# Patient Record
Sex: MALE | Race: WHITE | NOT HISPANIC OR LATINO
[De-identification: names, ages, dates, MRNs, and addresses within clinical notes are randomized per-mention and may not be internally consistent; named-entity substitution may affect disease eponyms.]

---

## 2017-12-13 ENCOUNTER — RX RENEWAL (OUTPATIENT)
Age: 77
End: 2017-12-13

## 2018-05-03 ENCOUNTER — APPOINTMENT (OUTPATIENT)
Dept: NEUROLOGY | Facility: CLINIC | Age: 78
End: 2018-05-03
Payer: MEDICARE

## 2018-05-03 VITALS
BODY MASS INDEX: 23.1 KG/M2 | SYSTOLIC BLOOD PRESSURE: 110 MMHG | HEIGHT: 71 IN | DIASTOLIC BLOOD PRESSURE: 82 MMHG | WEIGHT: 165 LBS

## 2018-05-03 DIAGNOSIS — Z86.79 PERSONAL HISTORY OF OTHER DISEASES OF THE CIRCULATORY SYSTEM: ICD-10-CM

## 2018-05-03 DIAGNOSIS — Z87.39 PERSONAL HISTORY OF OTHER DISEASES OF THE MUSCULOSKELETAL SYSTEM AND CONNECTIVE TISSUE: ICD-10-CM

## 2018-05-03 DIAGNOSIS — Z86.39 PERSONAL HISTORY OF OTHER ENDOCRINE, NUTRITIONAL AND METABOLIC DISEASE: ICD-10-CM

## 2018-05-03 PROCEDURE — 99214 OFFICE O/P EST MOD 30 MIN: CPT

## 2018-05-03 RX ORDER — ETANERCEPT 50 MG/ML
50 SOLUTION SUBCUTANEOUS
Qty: 12 | Refills: 0 | Status: ACTIVE | COMMUNITY
Start: 2018-03-22

## 2018-05-03 RX ORDER — CIPROFLOXACIN HYDROCHLORIDE 500 MG/1
500 TABLET, FILM COATED ORAL
Qty: 20 | Refills: 0 | Status: COMPLETED | COMMUNITY
Start: 2018-01-16

## 2018-05-03 RX ORDER — ATORVASTATIN CALCIUM 80 MG/1
80 TABLET, FILM COATED ORAL
Qty: 90 | Refills: 0 | Status: COMPLETED | COMMUNITY
Start: 2018-03-11

## 2018-05-03 RX ORDER — CLOPIDOGREL 75 MG/1
75 TABLET, FILM COATED ORAL
Refills: 0 | Status: ACTIVE | COMMUNITY

## 2018-05-03 RX ORDER — CLOPIDOGREL BISULFATE 75 MG/1
75 TABLET, FILM COATED ORAL
Qty: 90 | Refills: 0 | Status: COMPLETED | COMMUNITY
Start: 2018-01-08

## 2018-05-03 RX ORDER — METOPROLOL SUCCINATE 50 MG/1
50 TABLET, EXTENDED RELEASE ORAL
Qty: 90 | Refills: 0 | Status: ACTIVE | COMMUNITY
Start: 2018-01-08

## 2018-05-03 RX ORDER — OXCARBAZEPINE 150 MG/1
150 TABLET, FILM COATED ORAL
Refills: 0 | Status: COMPLETED | COMMUNITY

## 2018-05-03 RX ORDER — ATORVASTATIN CALCIUM 10 MG/1
10 TABLET, FILM COATED ORAL
Refills: 0 | Status: ACTIVE | COMMUNITY

## 2018-05-03 RX ORDER — ENALAPRIL MALEATE 5 MG/1
TABLET ORAL
Refills: 0 | Status: COMPLETED | COMMUNITY

## 2018-11-01 ENCOUNTER — APPOINTMENT (OUTPATIENT)
Dept: NEUROLOGY | Facility: CLINIC | Age: 78
End: 2018-11-01

## 2018-12-12 ENCOUNTER — APPOINTMENT (OUTPATIENT)
Dept: NEUROLOGY | Facility: CLINIC | Age: 78
End: 2018-12-12
Payer: MEDICARE

## 2018-12-12 VITALS
DIASTOLIC BLOOD PRESSURE: 78 MMHG | WEIGHT: 168 LBS | BODY MASS INDEX: 23.52 KG/M2 | SYSTOLIC BLOOD PRESSURE: 140 MMHG | HEIGHT: 71 IN

## 2018-12-12 PROCEDURE — 99214 OFFICE O/P EST MOD 30 MIN: CPT

## 2018-12-12 NOTE — HISTORY OF PRESENT ILLNESS
[FreeTextEntry1] : I saw this patient in the office today.\par \par As you recall he is a long history of tremors.\par This has been going on for years.\par It is mild to moderate but does not bother him significantly.\par He had no response to primidone. He is already on a beta blocker.\par \par He also has a history of trigeminal neuralgia. He has had several exacerbations over the years. He is currently on Trileptal at a dosage of 300 mg twice per day.\par \par He underwent trigeminal neuralgia surgery in 2012.\par \par \par

## 2018-12-12 NOTE — PHYSICAL EXAM
[General Appearance - Alert] : alert [General Appearance - In No Acute Distress] : in no acute distress [Oriented To Time, Place, And Person] : oriented to person, place, and time [Memory Recent] : recent memory was not impaired [Memory Remote] : remote memory was not impaired [Cranial Nerves Optic (II)] : visual acuity intact bilaterally,  visual fields full to confrontation, pupils equal round and reactive to light [Cranial Nerves Oculomotor (III)] : extraocular motion intact [Cranial Nerves Trigeminal (V)] : facial sensation intact symmetrically [Cranial Nerves Facial (VII)] : face symmetrical [Cranial Nerves Vestibulocochlear (VIII)] : hearing was intact bilaterally [Cranial Nerves Glossopharyngeal (IX)] : tongue and palate midline [Cranial Nerves Accessory (XI - Cranial And Spinal)] : head turning and shoulder shrug symmetric [Cranial Nerves Hypoglossal (XII)] : there was no tongue deviation with protrusion [Motor Tone] : muscle tone was normal in all four extremities [Motor Strength] : muscle strength was normal in all four extremities [Sensation Tactile Decrease] : light touch was intact [Sensation Pain / Temperature Decrease] : pain and temperature was intact [Sensation Vibration Decrease] : vibration was intact [Tremor] : a tremor present [2+] : Patella left 2+ [Optic Disc Abnormality] : the optic disc were normal in size and color [Edema] : there was no peripheral edema [Abnormal Walk] : normal gait [Dysarthria] : no dysarthria [Aphasia] : no dysphasia/aphasia [Romberg's Sign] : Romberg's sign was negtive [Coordination - Dysmetria Impaired Finger-to-Nose Bilateral] : not present [Plantar Reflex Right Only] : normal on the right [Plantar Reflex Left Only] : normal on the left

## 2018-12-12 NOTE — ASSESSMENT
[FreeTextEntry1] : This is a 78 year old man with a history of essential tremor. He made no response to Mysoline. He is currently on a beta blocker. He will continue this.\par \par He also has trigeminal neuralgia for which she is status post surgery. He had one brief exacerbation about 6 weeks ago.\par I will continue Trileptal 300 mg twice per day.\par \par I will see him back in 6 months.

## 2018-12-12 NOTE — CONSULT LETTER
[Dear  ___] : Dear  [unfilled], [Courtesy Letter:] : I had the pleasure of seeing your patient, [unfilled], in my office today. [Please see my note below.] : Please see my note below. [Consult Closing:] : Thank you very much for allowing me to participate in the care of this patient.  If you have any questions, please do not hesitate to contact me. [Sincerely,] : Sincerely, [FreeTextEntry3] : Maikol Mcghee MD.

## 2018-12-12 NOTE — REVIEW OF SYSTEMS
[As Noted in HPI] : as noted in HPI [Eye Pain] : eye pain [Chest Pain] : chest pain [Joint Pain] : joint pain [Easy Bleeding] : a tendency for easy bleeding [Negative] : Heme/Lymph

## 2018-12-13 ENCOUNTER — RX RENEWAL (OUTPATIENT)
Age: 78
End: 2018-12-13

## 2019-01-15 ENCOUNTER — EMERGENCY (EMERGENCY)
Facility: HOSPITAL | Age: 79
LOS: 1 days | Discharge: HOME | End: 2019-01-15
Attending: EMERGENCY MEDICINE
Payer: MEDICARE

## 2019-01-15 VITALS
HEIGHT: 71 IN | WEIGHT: 190.04 LBS | OXYGEN SATURATION: 98 % | SYSTOLIC BLOOD PRESSURE: 172 MMHG | TEMPERATURE: 98 F | DIASTOLIC BLOOD PRESSURE: 90 MMHG | RESPIRATION RATE: 16 BRPM | HEART RATE: 64 BPM

## 2019-01-15 PROCEDURE — 99285 EMERGENCY DEPT VISIT HI MDM: CPT | Mod: 25

## 2019-01-15 PROCEDURE — 93010 ELECTROCARDIOGRAM REPORT: CPT

## 2019-01-15 NOTE — ED ADULT TRIAGE NOTE - CHIEF COMPLAINT QUOTE
Pt states "I was coming back from [place] and on the plane I started getting really sweaty and had nausea with some difficulty breathing", denies diaphoresis and nausea at this time, denies chest pain

## 2019-01-16 VITALS
RESPIRATION RATE: 18 BRPM | SYSTOLIC BLOOD PRESSURE: 153 MMHG | DIASTOLIC BLOOD PRESSURE: 87 MMHG | OXYGEN SATURATION: 99 % | HEART RATE: 66 BPM

## 2019-01-16 LAB
ALBUMIN SERPL ELPH-MCNC: 4.1 G/DL — SIGNIFICANT CHANGE UP (ref 3.3–5.2)
ALP SERPL-CCNC: 83 U/L — SIGNIFICANT CHANGE UP (ref 40–120)
ALT FLD-CCNC: 21 U/L — SIGNIFICANT CHANGE UP
ANION GAP SERPL CALC-SCNC: 12 MMOL/L — SIGNIFICANT CHANGE UP (ref 5–17)
AST SERPL-CCNC: 23 U/L — SIGNIFICANT CHANGE UP
BILIRUB SERPL-MCNC: 0.3 MG/DL — LOW (ref 0.4–2)
BUN SERPL-MCNC: 17 MG/DL — SIGNIFICANT CHANGE UP (ref 8–20)
CALCIUM SERPL-MCNC: 9 MG/DL — SIGNIFICANT CHANGE UP (ref 8.6–10.2)
CHLORIDE SERPL-SCNC: 99 MMOL/L — SIGNIFICANT CHANGE UP (ref 98–107)
CO2 SERPL-SCNC: 26 MMOL/L — SIGNIFICANT CHANGE UP (ref 22–29)
CREAT SERPL-MCNC: 0.96 MG/DL — SIGNIFICANT CHANGE UP (ref 0.5–1.3)
GLUCOSE SERPL-MCNC: 118 MG/DL — HIGH (ref 70–115)
HCT VFR BLD CALC: 45.5 % — SIGNIFICANT CHANGE UP (ref 42–52)
HGB BLD-MCNC: 14.7 G/DL — SIGNIFICANT CHANGE UP (ref 14–18)
LIDOCAIN IGE QN: 31 U/L — SIGNIFICANT CHANGE UP (ref 22–51)
MAGNESIUM SERPL-MCNC: 1.8 MG/DL — SIGNIFICANT CHANGE UP (ref 1.6–2.6)
MCHC RBC-ENTMCNC: 30.6 PG — SIGNIFICANT CHANGE UP (ref 27–31)
MCHC RBC-ENTMCNC: 32.3 G/DL — SIGNIFICANT CHANGE UP (ref 32–36)
MCV RBC AUTO: 94.8 FL — HIGH (ref 80–94)
NT-PROBNP SERPL-SCNC: 135 PG/ML — SIGNIFICANT CHANGE UP (ref 0–300)
PLATELET # BLD AUTO: 230 K/UL — SIGNIFICANT CHANGE UP (ref 150–400)
POTASSIUM SERPL-MCNC: 4.2 MMOL/L — SIGNIFICANT CHANGE UP (ref 3.5–5.3)
POTASSIUM SERPL-SCNC: 4.2 MMOL/L — SIGNIFICANT CHANGE UP (ref 3.5–5.3)
PROT SERPL-MCNC: 7 G/DL — SIGNIFICANT CHANGE UP (ref 6.6–8.7)
RBC # BLD: 4.8 M/UL — SIGNIFICANT CHANGE UP (ref 4.6–6.2)
RBC # FLD: 13.3 % — SIGNIFICANT CHANGE UP (ref 11–15.6)
SODIUM SERPL-SCNC: 137 MMOL/L — SIGNIFICANT CHANGE UP (ref 135–145)
TROPONIN T SERPL-MCNC: <0.01 NG/ML — SIGNIFICANT CHANGE UP (ref 0–0.06)
TROPONIN T SERPL-MCNC: <0.01 NG/ML — SIGNIFICANT CHANGE UP (ref 0–0.06)
WBC # BLD: 10.1 K/UL — SIGNIFICANT CHANGE UP (ref 4.8–10.8)
WBC # FLD AUTO: 10.1 K/UL — SIGNIFICANT CHANGE UP (ref 4.8–10.8)

## 2019-01-16 PROCEDURE — 83690 ASSAY OF LIPASE: CPT

## 2019-01-16 PROCEDURE — 36415 COLL VENOUS BLD VENIPUNCTURE: CPT

## 2019-01-16 PROCEDURE — 83880 ASSAY OF NATRIURETIC PEPTIDE: CPT

## 2019-01-16 PROCEDURE — 71046 X-RAY EXAM CHEST 2 VIEWS: CPT

## 2019-01-16 PROCEDURE — 99284 EMERGENCY DEPT VISIT MOD MDM: CPT | Mod: 25

## 2019-01-16 PROCEDURE — 84484 ASSAY OF TROPONIN QUANT: CPT

## 2019-01-16 PROCEDURE — 85027 COMPLETE CBC AUTOMATED: CPT

## 2019-01-16 PROCEDURE — 80053 COMPREHEN METABOLIC PANEL: CPT

## 2019-01-16 PROCEDURE — 93010 ELECTROCARDIOGRAM REPORT: CPT

## 2019-01-16 PROCEDURE — 71046 X-RAY EXAM CHEST 2 VIEWS: CPT | Mod: 26

## 2019-01-16 PROCEDURE — 93005 ELECTROCARDIOGRAM TRACING: CPT

## 2019-01-16 PROCEDURE — 83735 ASSAY OF MAGNESIUM: CPT

## 2019-01-16 RX ORDER — METOPROLOL TARTRATE 50 MG
0 TABLET ORAL
Qty: 0 | Refills: 0 | COMMUNITY

## 2019-01-16 RX ORDER — GABAPENTIN 400 MG/1
1 CAPSULE ORAL
Qty: 0 | Refills: 0 | COMMUNITY

## 2019-01-16 RX ORDER — ETANERCEPT 25 MG
0 VIAL (EA) SUBCUTANEOUS
Qty: 0 | Refills: 0 | COMMUNITY

## 2019-01-16 RX ORDER — CLOPIDOGREL BISULFATE 75 MG/1
1 TABLET, FILM COATED ORAL
Qty: 0 | Refills: 0 | COMMUNITY

## 2019-01-16 RX ORDER — ATORVASTATIN CALCIUM 80 MG/1
1 TABLET, FILM COATED ORAL
Qty: 0 | Refills: 0 | COMMUNITY
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
## 2019-01-16 NOTE — ED ADULT NURSE REASSESSMENT NOTE - NS ED NURSE REASSESS COMMENT FT1
wife and Son left to go home.  requesting that kt that is a  in [facility] be called  Dr. Sarah Nails [phone]. Dr hooks aware

## 2019-01-16 NOTE — ED ADULT NURSE NOTE - NSIMPLEMENTINTERV_GEN_ALL_ED
Implemented All Fall with Harm Risk Interventions:  [place] to call system. Call bell, personal items and telephone within reach. Instruct patient to call for assistance. Room bathroom lighting operational. Non-slip footwear when patient is off stretcher. Physically safe environment: no spills, clutter or unnecessary equipment. Stretcher in lowest position, wheels locked, appropriate side rails in place. Provide visual cue, wrist band, yellow gown, etc. Monitor gait and stability. Monitor for mental status changes and reorient to person, place, and time. Review medications for side effects contributing to fall risk. Reinforce activity limits and safety measures with patient and family. Provide visual clues: red socks.
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
## 2019-01-16 NOTE — ED PROVIDER NOTE - OBJECTIVE STATEMENT
77 y/o M with PMHx of CAD, HTN, dyslipidemia and MI (in 2002) presents to ED c/o sudden onset of lightheadedness, shortness of breath, nausea and diaphoresis while on a plane home from [facility]. He states his symptoms began suddenly but wearing oxygen provided relief. Secondary to current symptoms, notes he has had a few episodes of diarrhea, which he attributes to the new and spicy foods he ate while on vacation. Denies recent fevers, chest pain, abdominal pain, vomiting, leg pain or headache. States he is currently asymptomatic, but was concerned because his previous MI caused extreme diaphoresis without chest pain. No further acute complaints at this time.     Cardiologist: Dr. Bk Coyle ([facility])- last cardiac workup including stress test was within the past year, was told everything was WNL 79 y/o M with PMHx of CAD, HTN, dyslipidemia and MI (in 2002) presents to ED c/o sudden onset of shortness of breath, nausea , diaphoresis and mild lightheadedness while on a plane home from [facility]. He states his symptoms began suddenly but wearing oxygen provided relief. Secondary to current symptoms, notes he has had a few episodes of diarrhea, which he attributes to the new and spicy foods he ate while on vacation. Denies recent fevers, chest pain, abdominal pain, vomiting, leg pain or headache. States he is currently asymptomatic, but was concerned because his previous MI caused extreme diaphoresis without chest pain. No further acute complaints at this time.     Cardiologist: Dr. Bk Coyle ([facility])- last cardiac workup including stress test was within the past year, was told everything was WNL 79 y/o M with PMHx of CAD, HTN, dyslipidemia and MI (in 2002) presents to ED c/o nausea , diaphoresis and mild lightheadedness while on a plane home from [facility]. He states his symptoms began suddenly but wearing oxygen provided relief. Secondary to current symptoms, notes he has had a few episodes of diarrhea, which he attributes to the new and spicy foods he ate while on vacation. Denies recent fevers, chest pain, abdominal pain, vomiting, leg pain or headache. States he is currently asymptomatic, but was concerned because his previous MI caused extreme diaphoresis without chest pain. No further acute complaints at this time.     Cardiologist: Dr. Bk Coyle ([facility])- last cardiac workup including stress test was within the past year, was told everything was WNL 77 y/o M with PMHx of CAD, HTN, dyslipidemia and MI (in 2002) presents to ED c/o nausea, diaphoresis and mild lightheadedness while on a plane home from [facility]. He states his symptoms began suddenly but wearing oxygen provided relief. Secondary to current symptoms, notes he has had a few episodes of diarrhea, which he attributes to the new and spicy foods he ate while on vacation. Denies recent fevers, chest pain, abdominal pain, vomiting, leg pain or headache. States he is currently asymptomatic, but was concerned because his previous MI caused extreme diaphoresis without chest pain in 2002. Patient had a normal stress test in november 2018. No further acute complaints at this time.   Cardiologist: Dr. Bk Coyle ([facility])- last cardiac workup including stress test was within the past year, was told everything was WNL

## 2019-01-16 NOTE — ED PROVIDER NOTE - PMH
Bell's palsy  left side 1989  CAD (coronary artery disease)  MI 2002  Dyslipidemia    H/O benign essential tremor    HTN (hypertension)    Rheumatoid arthritis

## 2019-01-16 NOTE — ED ADULT NURSE REASSESSMENT NOTE - NS ED NURSE REASSESS COMMENT FT1
pt received awake and alert resting comfortably denies any chest pain no sob present. pt sinus on monitor. Dr Romero from cardiology at the bedside

## 2019-01-16 NOTE — ED PROVIDER NOTE - PROGRESS NOTE DETAILS
patient has remained stable. [place]ology paged for consultation. patient has remained stable without return of symptoms. [place] [facility]ology paged for consultation. patient has remained stable without return of symptoms. [facility] paged for consultation. Case discussed with patient daughter at Mr. Nails's request. SEEN BY DIANNA PORRAS CARDIO (MD ERROR)  CLEARED FOR DC AND OUTPT FOLLOW UP  CARDIO SPOKE WITH DAUGHTER RE FINDINGS

## 2019-01-16 NOTE — CONSULT NOTE ADULT - ASSESSMENT
1. Elderly male presented from airport with diaphoresis nausea and some dyspnea. Currently w/o sx's.  2. hx of cad/remote mi with stents to rca and circ-ekg nl and troponins are neg. He had a negative nuclear stress test recently. No current evidence to suggest ACS. Can dc on current regimen to follow up with his cardiologist.  3. hx ofbell's palsy  4. hx of RA.

## 2019-01-16 NOTE — ED ADULT NURSE NOTE - OBJECTIVE STATEMENT
patient states was traveling from [facility] and started having lightheaded sweaty and nausea while on plane . patient deneis any chest pain or short of breath.

## 2019-01-16 NOTE — ED PROVIDER NOTE - MEDICAL DECISION MAKING DETAILS
Patient with episode of nausea and diaphoresis, possible GI in nature, but also may be cardiac, will obtain labs and observe.

## 2019-01-17 NOTE — ED POST DISCHARGE NOTE - RESULT SUMMARY
called and spoke with pt -pt denies any fever  or chills or coughing - cxr explained to the pt and recommended  f/u with primary care for further evaluation

## 2019-03-12 ENCOUNTER — MEDICATION RENEWAL (OUTPATIENT)
Age: 79
End: 2019-03-12

## 2019-06-12 ENCOUNTER — APPOINTMENT (OUTPATIENT)
Dept: NEUROLOGY | Facility: CLINIC | Age: 79
End: 2019-06-12
Payer: MEDICARE

## 2019-06-12 VITALS
WEIGHT: 165 LBS | BODY MASS INDEX: 23.1 KG/M2 | HEIGHT: 71 IN | DIASTOLIC BLOOD PRESSURE: 60 MMHG | SYSTOLIC BLOOD PRESSURE: 110 MMHG

## 2019-06-12 PROCEDURE — 99213 OFFICE O/P EST LOW 20 MIN: CPT

## 2019-06-12 NOTE — PHYSICAL EXAM
[General Appearance - Alert] : alert [Oriented To Time, Place, And Person] : oriented to person, place, and time [General Appearance - In No Acute Distress] : in no acute distress [Memory Recent] : recent memory was not impaired [Memory Remote] : remote memory was not impaired [Cranial Nerves Optic (II)] : visual acuity intact bilaterally,  visual fields full to confrontation, pupils equal round and reactive to light [Cranial Nerves Trigeminal (V)] : facial sensation intact symmetrically [Cranial Nerves Oculomotor (III)] : extraocular motion intact [Cranial Nerves Vestibulocochlear (VIII)] : hearing was intact bilaterally [Cranial Nerves Facial (VII)] : face symmetrical [Cranial Nerves Accessory (XI - Cranial And Spinal)] : head turning and shoulder shrug symmetric [Cranial Nerves Glossopharyngeal (IX)] : tongue and palate midline [Cranial Nerves Hypoglossal (XII)] : there was no tongue deviation with protrusion [Motor Tone] : muscle tone was normal in all four extremities [Sensation Tactile Decrease] : light touch was intact [Motor Strength] : muscle strength was normal in all four extremities [Sensation Pain / Temperature Decrease] : pain and temperature was intact [Sensation Vibration Decrease] : vibration was intact [2+] : Patella right 2+ [Edema] : there was no peripheral edema [Optic Disc Abnormality] : the optic disc were normal in size and color [Abnormal Walk] : normal gait [Affect] : the affect was normal [Tremor] : a tremor present [Dysarthria] : no dysarthria [Aphasia] : no dysphasia/aphasia [Romberg's Sign] : Romberg's sign was negtive [Coordination - Dysmetria Impaired Finger-to-Nose Bilateral] : not present [Plantar Reflex Left Only] : normal on the left [Plantar Reflex Right Only] : normal on the right

## 2019-06-12 NOTE — CONSULT LETTER
[Courtesy Letter:] : I had the pleasure of seeing your patient, [unfilled], in my office today. [Dear  ___] : Dear  [unfilled], [Consult Closing:] : Thank you very much for allowing me to participate in the care of this patient.  If you have any questions, please do not hesitate to contact me. [Please see my note below.] : Please see my note below. [Sincerely,] : Sincerely, [FreeTextEntry3] : Maikol Mcghee MD.

## 2019-06-12 NOTE — REVIEW OF SYSTEMS
[As Noted in HPI] : as noted in HPI [Eye Pain] : eye pain [Chest Pain] : chest pain [Easy Bleeding] : a tendency for easy bleeding [Joint Pain] : joint pain [Negative] : Heme/Lymph

## 2019-12-03 ENCOUNTER — RX RENEWAL (OUTPATIENT)
Age: 79
End: 2019-12-03

## 2020-01-08 ENCOUNTER — APPOINTMENT (OUTPATIENT)
Dept: NEUROLOGY | Facility: CLINIC | Age: 80
End: 2020-01-08
Payer: MEDICARE

## 2020-01-08 VITALS
WEIGHT: 165 LBS | SYSTOLIC BLOOD PRESSURE: 130 MMHG | HEIGHT: 71 IN | DIASTOLIC BLOOD PRESSURE: 78 MMHG | BODY MASS INDEX: 23.1 KG/M2

## 2020-01-08 PROCEDURE — 99214 OFFICE O/P EST MOD 30 MIN: CPT

## 2020-01-08 NOTE — HISTORY OF PRESENT ILLNESS
[FreeTextEntry1] : I saw this patient in the office today.\par \par As you recall he has a long history of tremors.\par This has been going on for years.\par It is mild to moderate but does not bother him significantly.\par He had no response to primidone. He is already on a beta blocker.\par \par He also has a history of trigeminal neuralgia. He has had several exacerbations over the years. He is currently on Trileptal at a dosage of 300 mg twice per day.\par \par He underwent trigeminal neuralgia surgery in 2012.\par \par \par

## 2020-01-08 NOTE — REVIEW OF SYSTEMS
[Eye Pain] : eye pain [As Noted in HPI] : as noted in HPI [Joint Pain] : joint pain [Chest Pain] : chest pain [Easy Bleeding] : a tendency for easy bleeding [Negative] : Heme/Lymph

## 2020-01-08 NOTE — CONSULT LETTER
[Dear  ___] : Dear  [unfilled], [Please see my note below.] : Please see my note below. [Courtesy Letter:] : I had the pleasure of seeing your patient, [unfilled], in my office today. [Consult Closing:] : Thank you very much for allowing me to participate in the care of this patient.  If you have any questions, please do not hesitate to contact me. [Sincerely,] : Sincerely, [FreeTextEntry3] : Maikol Mcghee MD.

## 2020-01-08 NOTE — ASSESSMENT
[FreeTextEntry1] : This is a 79 year old man with a history of essential tremor. He made no response to Mysoline. He is currently on a beta blocker. He will continue this.\par \par He also has trigeminal neuralgia for which she is status post surgery. \par I will continue Trileptal 300 mg twice per day.\par \par I will see him back in 6 months.

## 2020-01-08 NOTE — PHYSICAL EXAM
[General Appearance - In No Acute Distress] : in no acute distress [General Appearance - Alert] : alert [Affect] : the affect was normal [Oriented To Time, Place, And Person] : oriented to person, place, and time [Memory Recent] : recent memory was not impaired [Memory Remote] : remote memory was not impaired [Cranial Nerves Optic (II)] : visual acuity intact bilaterally,  visual fields full to confrontation, pupils equal round and reactive to light [Cranial Nerves Oculomotor (III)] : extraocular motion intact [Cranial Nerves Facial (VII)] : face symmetrical [Cranial Nerves Vestibulocochlear (VIII)] : hearing was intact bilaterally [Cranial Nerves Trigeminal (V)] : facial sensation intact symmetrically [Cranial Nerves Glossopharyngeal (IX)] : tongue and palate midline [Cranial Nerves Hypoglossal (XII)] : there was no tongue deviation with protrusion [Motor Tone] : muscle tone was normal in all four extremities [Cranial Nerves Accessory (XI - Cranial And Spinal)] : head turning and shoulder shrug symmetric [Sensation Tactile Decrease] : light touch was intact [Motor Strength] : muscle strength was normal in all four extremities [Sensation Vibration Decrease] : vibration was intact [Sensation Pain / Temperature Decrease] : pain and temperature was intact [Tremor] : a tremor present [2+] : Patella left 2+ [Optic Disc Abnormality] : the optic disc were normal in size and color [Edema] : there was no peripheral edema [Abnormal Walk] : normal gait [Dysarthria] : no dysarthria [Aphasia] : no dysphasia/aphasia [Romberg's Sign] : Romberg's sign was negtive [Coordination - Dysmetria Impaired Finger-to-Nose Bilateral] : not present [Plantar Reflex Right Only] : normal on the right [Plantar Reflex Left Only] : normal on the left

## 2020-05-12 ENCOUNTER — OFFICE (OUTPATIENT)
Dept: URBAN - METROPOLITAN AREA CLINIC 10 | Facility: CLINIC | Age: 80
End: 2020-05-12
Payer: COMMERCIAL

## 2020-05-12 VITALS
SYSTOLIC BLOOD PRESSURE: 152 MMHG | HEART RATE: 62 BPM | TEMPERATURE: 97 F | WEIGHT: 172 LBS | HEIGHT: 68 IN | DIASTOLIC BLOOD PRESSURE: 81 MMHG

## 2020-05-12 DIAGNOSIS — K21.9 GASTRO-ESOPHAGEAL REFLUX DISEASE WITHOUT ESOPHAGITIS: ICD-10-CM

## 2020-05-12 DIAGNOSIS — R13.10 DYSPHAGIA, UNSPECIFIED: ICD-10-CM

## 2020-05-12 DIAGNOSIS — K50.00 CROHN'S DISEASE OF SMALL INTESTINE WITHOUT COMPLICATIONS: ICD-10-CM

## 2020-05-12 PROCEDURE — 99204 OFFICE O/P NEW MOD 45 MIN: CPT | Performed by: INTERNAL MEDICINE

## 2020-05-14 ENCOUNTER — AMBULATORY SURGICAL CENTER (OUTPATIENT)
Dept: URBAN - METROPOLITAN AREA SURGERY 1 | Facility: SURGERY | Age: 80
End: 2020-05-14
Payer: COMMERCIAL

## 2020-05-14 VITALS
OXYGEN SATURATION: 98 % | DIASTOLIC BLOOD PRESSURE: 68 MMHG | HEART RATE: 66 BPM | SYSTOLIC BLOOD PRESSURE: 111 MMHG | SYSTOLIC BLOOD PRESSURE: 155 MMHG | HEART RATE: 63 BPM | TEMPERATURE: 98.9 F | HEART RATE: 59 BPM | SYSTOLIC BLOOD PRESSURE: 111 MMHG | DIASTOLIC BLOOD PRESSURE: 68 MMHG | HEART RATE: 58 BPM | RESPIRATION RATE: 15 BRPM | HEART RATE: 59 BPM | TEMPERATURE: 98.9 F | SYSTOLIC BLOOD PRESSURE: 93 MMHG | HEART RATE: 58 BPM | SYSTOLIC BLOOD PRESSURE: 105 MMHG | OXYGEN SATURATION: 92 % | HEIGHT: 68 IN | OXYGEN SATURATION: 92 % | DIASTOLIC BLOOD PRESSURE: 83 MMHG | DIASTOLIC BLOOD PRESSURE: 54 MMHG | OXYGEN SATURATION: 98 % | RESPIRATION RATE: 15 BRPM | SYSTOLIC BLOOD PRESSURE: 111 MMHG | HEIGHT: 68 IN | HEART RATE: 66 BPM | SYSTOLIC BLOOD PRESSURE: 155 MMHG | SYSTOLIC BLOOD PRESSURE: 93 MMHG | SYSTOLIC BLOOD PRESSURE: 119 MMHG | RESPIRATION RATE: 18 BRPM | TEMPERATURE: 98.9 F | DIASTOLIC BLOOD PRESSURE: 51 MMHG | TEMPERATURE: 97.7 F | OXYGEN SATURATION: 98 % | WEIGHT: 169 LBS | DIASTOLIC BLOOD PRESSURE: 49 MMHG | HEART RATE: 58 BPM | HEART RATE: 63 BPM | DIASTOLIC BLOOD PRESSURE: 68 MMHG | OXYGEN SATURATION: 92 % | RESPIRATION RATE: 18 BRPM | SYSTOLIC BLOOD PRESSURE: 119 MMHG | OXYGEN SATURATION: 93 % | RESPIRATION RATE: 15 BRPM | SYSTOLIC BLOOD PRESSURE: 105 MMHG | HEART RATE: 63 BPM | WEIGHT: 169 LBS | DIASTOLIC BLOOD PRESSURE: 51 MMHG | DIASTOLIC BLOOD PRESSURE: 54 MMHG | SYSTOLIC BLOOD PRESSURE: 93 MMHG | RESPIRATION RATE: 18 BRPM | DIASTOLIC BLOOD PRESSURE: 54 MMHG | OXYGEN SATURATION: 93 % | DIASTOLIC BLOOD PRESSURE: 83 MMHG | DIASTOLIC BLOOD PRESSURE: 51 MMHG | SYSTOLIC BLOOD PRESSURE: 155 MMHG | SYSTOLIC BLOOD PRESSURE: 105 MMHG | DIASTOLIC BLOOD PRESSURE: 83 MMHG | HEIGHT: 68 IN | HEART RATE: 59 BPM | SYSTOLIC BLOOD PRESSURE: 119 MMHG | DIASTOLIC BLOOD PRESSURE: 49 MMHG | WEIGHT: 169 LBS | DIASTOLIC BLOOD PRESSURE: 49 MMHG | HEART RATE: 66 BPM | HEART RATE: 68 BPM | HEART RATE: 68 BPM | TEMPERATURE: 97.7 F | HEART RATE: 68 BPM | TEMPERATURE: 97.7 F | OXYGEN SATURATION: 93 %

## 2020-05-14 DIAGNOSIS — R13.10 DYSPHAGIA, UNSPECIFIED: ICD-10-CM

## 2020-05-14 DIAGNOSIS — K22.2 ESOPHAGEAL OBSTRUCTION: ICD-10-CM

## 2020-05-14 DIAGNOSIS — K21.9 GASTRO-ESOPHAGEAL REFLUX DISEASE WITHOUT ESOPHAGITIS: ICD-10-CM

## 2020-05-14 DIAGNOSIS — K44.9 DIAPHRAGMATIC HERNIA WITHOUT OBSTRUCTION OR GANGRENE: ICD-10-CM

## 2020-05-14 PROCEDURE — G8918 PT W/O PREOP ORDER IV AB PRO: HCPCS | Performed by: INTERNAL MEDICINE

## 2020-05-14 PROCEDURE — 43249 ESOPH EGD DILATION <30 MM: CPT | Performed by: INTERNAL MEDICINE

## 2020-05-14 PROCEDURE — G8907 PT DOC NO EVENTS ON DISCHARG: HCPCS | Performed by: INTERNAL MEDICINE

## 2020-06-01 ENCOUNTER — RX RENEWAL (OUTPATIENT)
Age: 80
End: 2020-06-01

## 2020-08-27 ENCOUNTER — APPOINTMENT (OUTPATIENT)
Dept: NEUROLOGY | Facility: CLINIC | Age: 80
End: 2020-08-27
Payer: MEDICARE

## 2020-08-27 VITALS
BODY MASS INDEX: 23.1 KG/M2 | HEIGHT: 71 IN | DIASTOLIC BLOOD PRESSURE: 80 MMHG | SYSTOLIC BLOOD PRESSURE: 139 MMHG | WEIGHT: 165 LBS | TEMPERATURE: 97.6 F

## 2020-08-27 PROCEDURE — 99214 OFFICE O/P EST MOD 30 MIN: CPT

## 2020-08-27 NOTE — CONSULT LETTER
[Dear  ___] : Dear  [unfilled], [Courtesy Letter:] : I had the pleasure of seeing your patient, [unfilled], in my office today. [Consult Closing:] : Thank you very much for allowing me to participate in the care of this patient.  If you have any questions, please do not hesitate to contact me. [Please see my note below.] : Please see my note below. [Sincerely,] : Sincerely, [FreeTextEntry3] : Maikol Mcghee MD.

## 2020-08-27 NOTE — PHYSICAL EXAM
[Oriented To Time, Place, And Person] : oriented to person, place, and time [General Appearance - In No Acute Distress] : in no acute distress [General Appearance - Alert] : alert [Affect] : the affect was normal [Memory Remote] : remote memory was not impaired [Memory Recent] : recent memory was not impaired [Cranial Nerves Trigeminal (V)] : facial sensation intact symmetrically [Cranial Nerves Oculomotor (III)] : extraocular motion intact [Cranial Nerves Optic (II)] : visual acuity intact bilaterally,  visual fields full to confrontation, pupils equal round and reactive to light [Cranial Nerves Facial (VII)] : face symmetrical [Cranial Nerves Vestibulocochlear (VIII)] : hearing was intact bilaterally [Cranial Nerves Accessory (XI - Cranial And Spinal)] : head turning and shoulder shrug symmetric [Cranial Nerves Hypoglossal (XII)] : there was no tongue deviation with protrusion [Motor Tone] : muscle tone was normal in all four extremities [Cranial Nerves Glossopharyngeal (IX)] : tongue and palate midline [Motor Strength] : muscle strength was normal in all four extremities [Sensation Tactile Decrease] : light touch was intact [Sensation Vibration Decrease] : vibration was intact [Sensation Pain / Temperature Decrease] : pain and temperature was intact [Abnormal Walk] : normal gait [Tremor] : a tremor present [2+] : Patella right 2+ [Optic Disc Abnormality] : the optic disc were normal in size and color [Edema] : there was no peripheral edema [Dysarthria] : no dysarthria [Aphasia] : no dysphasia/aphasia [Romberg's Sign] : Romberg's sign was negtive [Coordination - Dysmetria Impaired Finger-to-Nose Bilateral] : not present [Plantar Reflex Right Only] : normal on the right [Plantar Reflex Left Only] : normal on the left

## 2020-08-27 NOTE — ASSESSMENT
[FreeTextEntry1] : This is a 80 year old man with a history of essential tremor. He made no response to Mysoline. He is currently on a beta blocker. He will continue this.\par \par He also has trigeminal neuralgia for which she is status post surgery. \par I will continue Trileptal 300 mg twice per day.\par \par I will see him back in 6 months.

## 2020-09-21 ENCOUNTER — OFFICE (OUTPATIENT)
Dept: URBAN - METROPOLITAN AREA CLINIC 10 | Facility: CLINIC | Age: 80
End: 2020-09-21
Payer: COMMERCIAL

## 2020-09-21 VITALS
HEART RATE: 57 BPM | SYSTOLIC BLOOD PRESSURE: 150 MMHG | DIASTOLIC BLOOD PRESSURE: 78 MMHG | OXYGEN SATURATION: 99 % | HEIGHT: 68 IN | WEIGHT: 176 LBS

## 2020-09-21 DIAGNOSIS — K21.9 GASTRO-ESOPHAGEAL REFLUX DISEASE WITHOUT ESOPHAGITIS: ICD-10-CM

## 2020-09-21 DIAGNOSIS — R19.7 DIARRHEA, UNSPECIFIED: ICD-10-CM

## 2020-09-21 DIAGNOSIS — K50.00 CROHN'S DISEASE OF SMALL INTESTINE WITHOUT COMPLICATIONS: ICD-10-CM

## 2020-09-21 LAB
C-REACTIVE PROTEIN, QUANT: 2 MG/L (ref 0–10)
CBC WITH DIFFERENTIAL/PLATELET: BASO (ABSOLUTE): 0.1 X10E3/UL (ref 0–0.2)
CBC WITH DIFFERENTIAL/PLATELET: BASOS: 1 %
CBC WITH DIFFERENTIAL/PLATELET: EOS (ABSOLUTE): 0.1 X10E3/UL (ref 0–0.4)
CBC WITH DIFFERENTIAL/PLATELET: EOS: 2 %
CBC WITH DIFFERENTIAL/PLATELET: HEMATOCRIT: 44.1 % (ref 37.5–51)
CBC WITH DIFFERENTIAL/PLATELET: HEMOGLOBIN: 14.2 G/DL (ref 13–17.7)
CBC WITH DIFFERENTIAL/PLATELET: IMMATURE GRANS (ABS): 0 X10E3/UL (ref 0–0.1)
CBC WITH DIFFERENTIAL/PLATELET: IMMATURE GRANULOCYTES: 0 %
CBC WITH DIFFERENTIAL/PLATELET: LYMPHS (ABSOLUTE): 1.4 X10E3/UL (ref 0.7–3.1)
CBC WITH DIFFERENTIAL/PLATELET: LYMPHS: 24 %
CBC WITH DIFFERENTIAL/PLATELET: MCH: 30.4 PG (ref 26.6–33)
CBC WITH DIFFERENTIAL/PLATELET: MCHC: 32.2 G/DL (ref 31.5–35.7)
CBC WITH DIFFERENTIAL/PLATELET: MCV: 94 FL (ref 79–97)
CBC WITH DIFFERENTIAL/PLATELET: MONOCYTES(ABSOLUTE): 0.4 X10E3/UL (ref 0.1–0.9)
CBC WITH DIFFERENTIAL/PLATELET: MONOCYTES: 6 %
CBC WITH DIFFERENTIAL/PLATELET: NEUTROPHILS (ABSOLUTE): 3.9 X10E3/UL (ref 1.4–7)
CBC WITH DIFFERENTIAL/PLATELET: NEUTROPHILS: 67 %
CBC WITH DIFFERENTIAL/PLATELET: PLATELETS: 199 X10E3/UL (ref 150–450)
CBC WITH DIFFERENTIAL/PLATELET: RBC: 4.67 X10E6/UL (ref 4.14–5.8)
CBC WITH DIFFERENTIAL/PLATELET: RDW: 12.9 % (ref 11.6–15.4)
CBC WITH DIFFERENTIAL/PLATELET: WBC: 5.8 X10E3/UL (ref 3.4–10.8)
COMP. METABOLIC PANEL (14): A/G RATIO: 2.1 (ref 1.2–2.2)
COMP. METABOLIC PANEL (14): ALBUMIN: 4.1 G/DL (ref 3.7–4.7)
COMP. METABOLIC PANEL (14): ALKALINE PHOSPHATASE: 62 IU/L (ref 39–117)
COMP. METABOLIC PANEL (14): ALT (SGPT): 15 IU/L (ref 0–44)
COMP. METABOLIC PANEL (14): AST (SGOT): 17 IU/L (ref 0–40)
COMP. METABOLIC PANEL (14): BILIRUBIN, TOTAL: 0.5 MG/DL (ref 0–1.2)
COMP. METABOLIC PANEL (14): BUN/CREATININE RATIO: 12 (ref 10–24)
COMP. METABOLIC PANEL (14): BUN: 13 MG/DL (ref 8–27)
COMP. METABOLIC PANEL (14): CALCIUM: 9.1 MG/DL (ref 8.6–10.2)
COMP. METABOLIC PANEL (14): CARBON DIOXIDE, TOTAL: 25 MMOL/L (ref 20–29)
COMP. METABOLIC PANEL (14): CHLORIDE: 105 MMOL/L (ref 96–106)
COMP. METABOLIC PANEL (14): CREATININE: 1.13 MG/DL (ref 0.76–1.27)
COMP. METABOLIC PANEL (14): EGFR IF AFRICN AM: 71 ML/MIN/1.73 (ref 59–?)
COMP. METABOLIC PANEL (14): EGFR IF NONAFRICN AM: 61 ML/MIN/1.73 (ref 59–?)
COMP. METABOLIC PANEL (14): GLOBULIN, TOTAL: 2 G/DL (ref 1.5–4.5)
COMP. METABOLIC PANEL (14): GLUCOSE: 150 MG/DL — HIGH (ref 65–99)
COMP. METABOLIC PANEL (14): POTASSIUM: 5.2 MMOL/L (ref 3.5–5.2)
COMP. METABOLIC PANEL (14): PROTEIN, TOTAL: 6.1 G/DL (ref 6–8.5)
COMP. METABOLIC PANEL (14): SODIUM: 144 MMOL/L (ref 134–144)

## 2020-09-21 PROCEDURE — 99213 OFFICE O/P EST LOW 20 MIN: CPT | Performed by: PHYSICIAN ASSISTANT

## 2020-09-25 LAB — CALPROTECTIN, FECAL: 190 UG/G — HIGH (ref 0–120)

## 2020-10-16 ENCOUNTER — OFFICE (OUTPATIENT)
Dept: URBAN - METROPOLITAN AREA CLINIC 10 | Facility: CLINIC | Age: 80
End: 2020-10-16
Payer: COMMERCIAL

## 2020-10-16 VITALS
WEIGHT: 175 LBS | HEIGHT: 68 IN | HEART RATE: 58 BPM | DIASTOLIC BLOOD PRESSURE: 70 MMHG | OXYGEN SATURATION: 98 % | SYSTOLIC BLOOD PRESSURE: 132 MMHG

## 2020-10-16 DIAGNOSIS — K50.00 CROHN'S DISEASE OF SMALL INTESTINE WITHOUT COMPLICATIONS: ICD-10-CM

## 2020-10-16 DIAGNOSIS — R19.7 DIARRHEA, UNSPECIFIED: ICD-10-CM

## 2020-10-16 PROCEDURE — 99213 OFFICE O/P EST LOW 20 MIN: CPT | Performed by: PHYSICIAN ASSISTANT

## 2020-12-21 ENCOUNTER — RX RENEWAL (OUTPATIENT)
Age: 80
End: 2020-12-21

## 2021-02-25 ENCOUNTER — APPOINTMENT (OUTPATIENT)
Dept: NEUROLOGY | Facility: CLINIC | Age: 81
End: 2021-02-25
Payer: MEDICARE

## 2021-02-25 VITALS
WEIGHT: 165 LBS | BODY MASS INDEX: 23.1 KG/M2 | SYSTOLIC BLOOD PRESSURE: 140 MMHG | HEIGHT: 71 IN | TEMPERATURE: 97.2 F | DIASTOLIC BLOOD PRESSURE: 70 MMHG

## 2021-02-25 PROCEDURE — 99214 OFFICE O/P EST MOD 30 MIN: CPT

## 2021-02-25 NOTE — ASSESSMENT
[FreeTextEntry1] : This is a 80 year old man with a history of essential tremor. \par He made no response to Mysoline. \par He is currently on a beta blocker. He will continue this.\par \par He also has trigeminal neuralgia for which he is status post surgery. \par I will continue Trileptal 300 mg twice per day.\par \par I will see him back in 6 months.

## 2021-02-25 NOTE — PHYSICAL EXAM
[General Appearance - Alert] : alert [General Appearance - In No Acute Distress] : in no acute distress [Oriented To Time, Place, And Person] : oriented to person, place, and time [Affect] : the affect was normal [Memory Recent] : recent memory was not impaired [Memory Remote] : remote memory was not impaired [Cranial Nerves Optic (II)] : visual acuity intact bilaterally,  visual fields full to confrontation, pupils equal round and reactive to light [Cranial Nerves Oculomotor (III)] : extraocular motion intact [Cranial Nerves Trigeminal (V)] : facial sensation intact symmetrically [Cranial Nerves Facial (VII)] : face symmetrical [Cranial Nerves Vestibulocochlear (VIII)] : hearing was intact bilaterally [Cranial Nerves Glossopharyngeal (IX)] : tongue and palate midline [Cranial Nerves Accessory (XI - Cranial And Spinal)] : head turning and shoulder shrug symmetric [Cranial Nerves Hypoglossal (XII)] : there was no tongue deviation with protrusion [Motor Tone] : muscle tone was normal in all four extremities [Motor Strength] : muscle strength was normal in all four extremities [Sensation Tactile Decrease] : light touch was intact [Sensation Pain / Temperature Decrease] : pain and temperature was intact [Sensation Vibration Decrease] : vibration was intact [Abnormal Walk] : normal gait [Tremor] : a tremor present [2+] : Patella left 2+ [Optic Disc Abnormality] : the optic disc were normal in size and color [Edema] : there was no peripheral edema [Dysarthria] : no dysarthria [Aphasia] : no dysphasia/aphasia [Romberg's Sign] : Romberg's sign was negtive [Coordination - Dysmetria Impaired Finger-to-Nose Bilateral] : not present [Plantar Reflex Right Only] : normal on the right [Plantar Reflex Left Only] : normal on the left

## 2021-03-23 ENCOUNTER — OFFICE (OUTPATIENT)
Dept: URBAN - METROPOLITAN AREA CLINIC 10 | Facility: CLINIC | Age: 81
End: 2021-03-23
Payer: COMMERCIAL

## 2021-03-23 VITALS
HEART RATE: 54 BPM | DIASTOLIC BLOOD PRESSURE: 71 MMHG | SYSTOLIC BLOOD PRESSURE: 148 MMHG | WEIGHT: 173 LBS | HEIGHT: 68 IN

## 2021-03-23 DIAGNOSIS — K42.9 UMBILICAL HERNIA WITHOUT OBSTRUCTION OR GANGRENE: ICD-10-CM

## 2021-03-23 DIAGNOSIS — R13.10 DYSPHAGIA, UNSPECIFIED: ICD-10-CM

## 2021-03-23 DIAGNOSIS — K50.00 CROHN'S DISEASE OF SMALL INTESTINE WITHOUT COMPLICATIONS: ICD-10-CM

## 2021-03-23 PROCEDURE — 99213 OFFICE O/P EST LOW 20 MIN: CPT | Performed by: PHYSICIAN ASSISTANT

## 2021-03-29 ENCOUNTER — OFFICE (OUTPATIENT)
Dept: URBAN - METROPOLITAN AREA CLINIC 10 | Facility: CLINIC | Age: 81
End: 2021-03-29
Payer: COMMERCIAL

## 2021-03-29 VITALS
HEIGHT: 68 IN | HEART RATE: 35 BPM | SYSTOLIC BLOOD PRESSURE: 126 MMHG | DIASTOLIC BLOOD PRESSURE: 60 MMHG | WEIGHT: 172 LBS

## 2021-03-29 DIAGNOSIS — R10.9 UNSPECIFIED ABDOMINAL PAIN: ICD-10-CM

## 2021-03-29 LAB
CBC, PLATELET, NO DIFFERENTIAL: HEMATOCRIT: 40.6 % (ref 37.5–51)
CBC, PLATELET, NO DIFFERENTIAL: HEMOGLOBIN: 13.1 G/DL (ref 13–17.7)
CBC, PLATELET, NO DIFFERENTIAL: MCH: 29.9 PG (ref 26.6–33)
CBC, PLATELET, NO DIFFERENTIAL: MCHC: 32.3 G/DL (ref 31.5–35.7)
CBC, PLATELET, NO DIFFERENTIAL: MCV: 93 FL (ref 79–97)
CBC, PLATELET, NO DIFFERENTIAL: NRBC: (no result)
CBC, PLATELET, NO DIFFERENTIAL: PLATELETS: 262 X10E3/UL (ref 150–450)
CBC, PLATELET, NO DIFFERENTIAL: RBC: 4.38 X10E6/UL (ref 4.14–5.8)
CBC, PLATELET, NO DIFFERENTIAL: RDW: 12.6 % (ref 11.6–15.4)
CBC, PLATELET, NO DIFFERENTIAL: WBC: 9.9 X10E3/UL (ref 3.4–10.8)
COMP. METABOLIC PANEL (14): A/G RATIO: 1.4 (ref 1.2–2.2)
COMP. METABOLIC PANEL (14): ALBUMIN: 3.4 G/DL — LOW (ref 3.7–4.7)
COMP. METABOLIC PANEL (14): ALKALINE PHOSPHATASE: 77 IU/L (ref 39–117)
COMP. METABOLIC PANEL (14): ALT (SGPT): 11 IU/L (ref 0–44)
COMP. METABOLIC PANEL (14): AST (SGOT): 14 IU/L (ref 0–40)
COMP. METABOLIC PANEL (14): BILIRUBIN, TOTAL: 0.4 MG/DL (ref 0–1.2)
COMP. METABOLIC PANEL (14): BUN/CREATININE RATIO: 12 (ref 10–24)
COMP. METABOLIC PANEL (14): BUN: 14 MG/DL (ref 8–27)
COMP. METABOLIC PANEL (14): CALCIUM: 8.8 MG/DL (ref 8.6–10.2)
COMP. METABOLIC PANEL (14): CARBON DIOXIDE, TOTAL: 24 MMOL/L (ref 20–29)
COMP. METABOLIC PANEL (14): CHLORIDE: 103 MMOL/L (ref 96–106)
COMP. METABOLIC PANEL (14): CREATININE: 1.21 MG/DL (ref 0.76–1.27)
COMP. METABOLIC PANEL (14): EGFR IF AFRICN AM: 65 ML/MIN/1.73 (ref 59–?)
COMP. METABOLIC PANEL (14): EGFR IF NONAFRICN AM: 57 ML/MIN/1.73 — LOW (ref 59–?)
COMP. METABOLIC PANEL (14): GLOBULIN, TOTAL: 2.5 G/DL (ref 1.5–4.5)
COMP. METABOLIC PANEL (14): GLUCOSE: 98 MG/DL (ref 65–99)
COMP. METABOLIC PANEL (14): POTASSIUM: 4.2 MMOL/L (ref 3.5–5.2)
COMP. METABOLIC PANEL (14): PROTEIN, TOTAL: 5.9 G/DL — LOW (ref 6–8.5)
COMP. METABOLIC PANEL (14): SODIUM: 138 MMOL/L (ref 134–144)

## 2021-03-29 PROCEDURE — 99213 OFFICE O/P EST LOW 20 MIN: CPT | Performed by: INTERNAL MEDICINE

## 2021-03-29 RX ORDER — CIPROFLOXACIN 500 MG/1
1000 TABLET, FILM COATED ORAL
Qty: 14 | Refills: 0 | Status: COMPLETED
Start: 2021-03-29 | End: 2021-10-07

## 2021-03-29 RX ORDER — METRONIDAZOLE 500 MG/1
1000 TABLET, FILM COATED ORAL
Qty: 14 | Refills: 0 | Status: COMPLETED
Start: 2021-03-29 | End: 2021-10-07

## 2021-05-07 ENCOUNTER — OFFICE (OUTPATIENT)
Dept: URBAN - METROPOLITAN AREA CLINIC 10 | Facility: CLINIC | Age: 81
End: 2021-05-07
Payer: COMMERCIAL

## 2021-05-07 VITALS
HEIGHT: 68 IN | WEIGHT: 169 LBS | DIASTOLIC BLOOD PRESSURE: 73 MMHG | SYSTOLIC BLOOD PRESSURE: 146 MMHG | HEART RATE: 56 BPM

## 2021-05-07 DIAGNOSIS — K57.32 DIVERTICULITIS OF LARGE INTESTINE WITHOUT PERFORATION OR ABS: ICD-10-CM

## 2021-05-07 PROCEDURE — 99214 OFFICE O/P EST MOD 30 MIN: CPT | Performed by: PHYSICIAN ASSISTANT

## 2021-05-07 RX ORDER — POLYETHYLENE GLYCOL 3350, SODIUM SULFATE, SODIUM CHLORIDE, POTASSIUM CHLORIDE, ASCORBIC ACID, SODIUM ASCORBATE 140-9-5.2G
KIT ORAL
Qty: 1 | Refills: 0 | Status: COMPLETED
Start: 2021-05-07 | End: 2021-06-01

## 2021-06-01 ENCOUNTER — AMBULATORY SURGICAL CENTER (OUTPATIENT)
Dept: URBAN - METROPOLITAN AREA SURGERY 1 | Facility: SURGERY | Age: 81
End: 2021-06-01
Payer: COMMERCIAL

## 2021-06-01 ENCOUNTER — OFFICE (OUTPATIENT)
Dept: URBAN - METROPOLITAN AREA PATHOLOGY 22 | Facility: PATHOLOGY | Age: 81
End: 2021-06-01
Payer: COMMERCIAL

## 2021-06-01 VITALS
RESPIRATION RATE: 16 BRPM | SYSTOLIC BLOOD PRESSURE: 135 MMHG | DIASTOLIC BLOOD PRESSURE: 86 MMHG | DIASTOLIC BLOOD PRESSURE: 48 MMHG | HEART RATE: 66 BPM | TEMPERATURE: 97.9 F | HEART RATE: 67 BPM | DIASTOLIC BLOOD PRESSURE: 76 MMHG | TEMPERATURE: 97.3 F | SYSTOLIC BLOOD PRESSURE: 152 MMHG | SYSTOLIC BLOOD PRESSURE: 108 MMHG | TEMPERATURE: 97.9 F | HEART RATE: 66 BPM | HEART RATE: 72 BPM | SYSTOLIC BLOOD PRESSURE: 152 MMHG | SYSTOLIC BLOOD PRESSURE: 108 MMHG | OXYGEN SATURATION: 98 % | SYSTOLIC BLOOD PRESSURE: 108 MMHG | OXYGEN SATURATION: 99 % | TEMPERATURE: 97.9 F | HEART RATE: 72 BPM | HEART RATE: 67 BPM | DIASTOLIC BLOOD PRESSURE: 72 MMHG | SYSTOLIC BLOOD PRESSURE: 151 MMHG | WEIGHT: 163 LBS | OXYGEN SATURATION: 98 % | RESPIRATION RATE: 16 BRPM | HEART RATE: 67 BPM | HEART RATE: 77 BPM | SYSTOLIC BLOOD PRESSURE: 151 MMHG | OXYGEN SATURATION: 98 % | DIASTOLIC BLOOD PRESSURE: 86 MMHG | DIASTOLIC BLOOD PRESSURE: 86 MMHG | TEMPERATURE: 97.3 F | RESPIRATION RATE: 17 BRPM | DIASTOLIC BLOOD PRESSURE: 72 MMHG | DIASTOLIC BLOOD PRESSURE: 48 MMHG | SYSTOLIC BLOOD PRESSURE: 164 MMHG | TEMPERATURE: 97.3 F | OXYGEN SATURATION: 97 % | SYSTOLIC BLOOD PRESSURE: 152 MMHG | OXYGEN SATURATION: 97 % | SYSTOLIC BLOOD PRESSURE: 151 MMHG | HEART RATE: 68 BPM | SYSTOLIC BLOOD PRESSURE: 164 MMHG | DIASTOLIC BLOOD PRESSURE: 48 MMHG | SYSTOLIC BLOOD PRESSURE: 135 MMHG | RESPIRATION RATE: 16 BRPM | DIASTOLIC BLOOD PRESSURE: 76 MMHG | OXYGEN SATURATION: 99 % | WEIGHT: 163 LBS | DIASTOLIC BLOOD PRESSURE: 76 MMHG | SYSTOLIC BLOOD PRESSURE: 164 MMHG | HEART RATE: 72 BPM | HEART RATE: 66 BPM | HEIGHT: 68 IN | HEART RATE: 68 BPM | OXYGEN SATURATION: 99 % | HEART RATE: 68 BPM | HEART RATE: 77 BPM | HEIGHT: 68 IN | RESPIRATION RATE: 17 BRPM | SYSTOLIC BLOOD PRESSURE: 135 MMHG | DIASTOLIC BLOOD PRESSURE: 72 MMHG | WEIGHT: 163 LBS | HEIGHT: 68 IN | RESPIRATION RATE: 17 BRPM | HEART RATE: 77 BPM | OXYGEN SATURATION: 97 %

## 2021-06-01 DIAGNOSIS — K57.30 DIVERTICULOSIS OF LARGE INTESTINE WITHOUT PERFORATION OR ABS: ICD-10-CM

## 2021-06-01 DIAGNOSIS — K50.10 CROHN'S DISEASE OF LARGE INTESTINE WITHOUT COMPLICATIONS: ICD-10-CM

## 2021-06-01 DIAGNOSIS — R93.3 ABNORMAL FINDINGS ON DIAGNOSTIC IMAGING OF OTHER PARTS OF DI: ICD-10-CM

## 2021-06-01 PROBLEM — K63.89 OTHER SPECIFIED DISEASES OF INTESTINE: Status: ACTIVE | Noted: 2021-06-01

## 2021-06-01 PROCEDURE — 45380 COLONOSCOPY AND BIOPSY: CPT | Performed by: INTERNAL MEDICINE

## 2021-06-01 PROCEDURE — 88305 TISSUE EXAM BY PATHOLOGIST: CPT | Performed by: INTERNAL MEDICINE

## 2021-06-01 PROCEDURE — G8918 PT W/O PREOP ORDER IV AB PRO: HCPCS | Performed by: INTERNAL MEDICINE

## 2021-06-01 PROCEDURE — G8907 PT DOC NO EVENTS ON DISCHARG: HCPCS | Performed by: INTERNAL MEDICINE

## 2021-06-01 PROCEDURE — 88342 IMHCHEM/IMCYTCHM 1ST ANTB: CPT | Performed by: INTERNAL MEDICINE

## 2021-06-21 ENCOUNTER — RX RENEWAL (OUTPATIENT)
Age: 81
End: 2021-06-21

## 2021-08-26 ENCOUNTER — APPOINTMENT (OUTPATIENT)
Dept: NEUROLOGY | Facility: CLINIC | Age: 81
End: 2021-08-26
Payer: MEDICARE

## 2021-08-26 PROCEDURE — 99214 OFFICE O/P EST MOD 30 MIN: CPT

## 2021-08-26 NOTE — ASSESSMENT
[FreeTextEntry1] : This is an 81 year old man with a history of essential tremor. \par He made no response to Mysoline. \par He is currently on a beta blocker. He will continue this.\par \par He also has trigeminal neuralgia for which he is status post surgery. \par I will continue Trileptal 300 mg twice per day.\par \par I will see him back in 6 months.

## 2021-10-07 ENCOUNTER — OFFICE (OUTPATIENT)
Dept: URBAN - METROPOLITAN AREA CLINIC 10 | Facility: CLINIC | Age: 81
End: 2021-10-07
Payer: COMMERCIAL

## 2021-10-07 VITALS
WEIGHT: 169 LBS | HEIGHT: 68 IN | OXYGEN SATURATION: 94 % | SYSTOLIC BLOOD PRESSURE: 138 MMHG | DIASTOLIC BLOOD PRESSURE: 71 MMHG | HEART RATE: 64 BPM

## 2021-10-07 DIAGNOSIS — R13.10 DYSPHAGIA, UNSPECIFIED: ICD-10-CM

## 2021-10-07 DIAGNOSIS — K50.00 CROHN'S DISEASE OF SMALL INTESTINE WITHOUT COMPLICATIONS: ICD-10-CM

## 2021-10-07 PROCEDURE — 99213 OFFICE O/P EST LOW 20 MIN: CPT | Performed by: PHYSICIAN ASSISTANT

## 2021-12-20 ENCOUNTER — RX RENEWAL (OUTPATIENT)
Age: 81
End: 2021-12-20

## 2021-12-22 ENCOUNTER — TRANSCRIPTION ENCOUNTER (OUTPATIENT)
Age: 81
End: 2021-12-22

## 2022-03-08 ENCOUNTER — APPOINTMENT (OUTPATIENT)
Dept: NEUROLOGY | Facility: CLINIC | Age: 82
End: 2022-03-08
Payer: MEDICARE

## 2022-03-08 VITALS
SYSTOLIC BLOOD PRESSURE: 140 MMHG | DIASTOLIC BLOOD PRESSURE: 80 MMHG | BODY MASS INDEX: 23.1 KG/M2 | HEIGHT: 71 IN | WEIGHT: 165 LBS

## 2022-03-08 PROCEDURE — 99214 OFFICE O/P EST MOD 30 MIN: CPT

## 2022-03-08 NOTE — PHYSICAL EXAM
[General Appearance - Alert] : alert [General Appearance - In No Acute Distress] : in no acute distress [Oriented To Time, Place, And Person] : oriented to person, place, and time [Affect] : the affect was normal [Memory Recent] : recent memory was not impaired [Memory Remote] : remote memory was not impaired [Cranial Nerves Optic (II)] : visual acuity intact bilaterally,  visual fields full to confrontation, pupils equal round and reactive to light [Cranial Nerves Oculomotor (III)] : extraocular motion intact [Cranial Nerves Trigeminal (V)] : facial sensation intact symmetrically [Cranial Nerves Facial (VII)] : face symmetrical [Cranial Nerves Vestibulocochlear (VIII)] : hearing was intact bilaterally [Cranial Nerves Glossopharyngeal (IX)] : tongue and palate midline [Cranial Nerves Accessory (XI - Cranial And Spinal)] : head turning and shoulder shrug symmetric [Cranial Nerves Hypoglossal (XII)] : there was no tongue deviation with protrusion [Motor Tone] : muscle tone was normal in all four extremities [Motor Strength] : muscle strength was normal in all four extremities [Sensation Tactile Decrease] : light touch was intact [Sensation Pain / Temperature Decrease] : pain and temperature was intact [Sensation Vibration Decrease] : vibration was intact [Abnormal Walk] : normal gait [2+] : Patella left 2+ [Optic Disc Abnormality] : the optic disc were normal in size and color [Edema] : there was no peripheral edema [Tremor] : a tremor present [Dysarthria] : no dysarthria [Aphasia] : no dysphasia/aphasia [Romberg's Sign] : Romberg's sign was negtive [Coordination - Dysmetria Impaired Finger-to-Nose Bilateral] : not present [Plantar Reflex Right Only] : normal on the right [Plantar Reflex Left Only] : normal on the left [FreeTextEntry8] : There is no cogwheeling.  There is no masking of facial expression.

## 2022-05-30 ENCOUNTER — NON-APPOINTMENT (OUTPATIENT)
Age: 82
End: 2022-05-30

## 2022-09-08 ENCOUNTER — APPOINTMENT (OUTPATIENT)
Dept: NEUROLOGY | Facility: CLINIC | Age: 82
End: 2022-09-08

## 2022-09-08 VITALS
HEIGHT: 71 IN | WEIGHT: 165 LBS | BODY MASS INDEX: 23.1 KG/M2 | SYSTOLIC BLOOD PRESSURE: 140 MMHG | DIASTOLIC BLOOD PRESSURE: 78 MMHG

## 2022-09-08 PROCEDURE — 99214 OFFICE O/P EST MOD 30 MIN: CPT

## 2022-09-08 NOTE — HISTORY OF PRESENT ILLNESS
[FreeTextEntry1] : I saw this patient in the office today.\par \par As you recall he has a long history of tremors.\par This has been going on for years.\par It is mild to moderate but does not bother him significantly.\par He had no response to primidone. He is already on a beta blocker.\par \par He also has a history of trigeminal neuralgia. He has had several exacerbations over the years. He is currently on Trileptal at a dosage of 300 mg twice per day.\par \par He underwent trigeminal neuralgia surgery in 2012.\par \par 9/8/2022 visit:\par He now reports severe exacerbation of left-sided facial pain.\par \par

## 2022-09-08 NOTE — PHYSICAL EXAM
[General Appearance - Alert] : alert [General Appearance - In No Acute Distress] : in no acute distress [Oriented To Time, Place, And Person] : oriented to person, place, and time [Affect] : the affect was normal [Memory Recent] : recent memory was not impaired [Memory Remote] : remote memory was not impaired [Cranial Nerves Optic (II)] : visual acuity intact bilaterally,  visual fields full to confrontation, pupils equal round and reactive to light [Cranial Nerves Oculomotor (III)] : extraocular motion intact [Cranial Nerves Trigeminal (V)] : facial sensation intact symmetrically [Cranial Nerves Facial (VII)] : face symmetrical [Cranial Nerves Vestibulocochlear (VIII)] : hearing was intact bilaterally [Cranial Nerves Glossopharyngeal (IX)] : tongue and palate midline [Cranial Nerves Accessory (XI - Cranial And Spinal)] : head turning and shoulder shrug symmetric [Cranial Nerves Hypoglossal (XII)] : there was no tongue deviation with protrusion [Motor Tone] : muscle tone was normal in all four extremities [Motor Strength] : muscle strength was normal in all four extremities [Sensation Tactile Decrease] : light touch was intact [Sensation Pain / Temperature Decrease] : pain and temperature was intact [Sensation Vibration Decrease] : vibration was intact [Abnormal Walk] : normal gait [Tremor] : a tremor present [2+] : Patella left 2+ [Optic Disc Abnormality] : the optic disc were normal in size and color [Edema] : there was no peripheral edema [Dysarthria] : no dysarthria [Aphasia] : no dysphasia/aphasia [Romberg's Sign] : Romberg's sign was negtive [Coordination - Dysmetria Impaired Finger-to-Nose Bilateral] : not present [Plantar Reflex Right Only] : normal on the right [Plantar Reflex Left Only] : normal on the left [FreeTextEntry8] : There is no cogwheeling.  There is no masking of facial expression.

## 2022-09-08 NOTE — ASSESSMENT
[FreeTextEntry1] : This is an 82 year old man with a history of essential tremor. \par He made no response to Mysoline. \par He is currently on a beta blocker. He will continue this.\par \par He also has trigeminal neuralgia for which he is status post surgery. \par He is having a severe exacerbation of left-sided facial pain.\par I will increase oxcarbazepine to 600 mg twice per day.\par \par I will see him back in 3 months.

## 2022-10-10 ENCOUNTER — NON-APPOINTMENT (OUTPATIENT)
Age: 82
End: 2022-10-10

## 2022-10-10 ENCOUNTER — APPOINTMENT (OUTPATIENT)
Dept: CARDIOLOGY | Facility: CLINIC | Age: 82
End: 2022-10-10

## 2022-10-10 VITALS
BODY MASS INDEX: 21.7 KG/M2 | WEIGHT: 155 LBS | HEART RATE: 63 BPM | HEIGHT: 71 IN | OXYGEN SATURATION: 97 % | DIASTOLIC BLOOD PRESSURE: 76 MMHG | SYSTOLIC BLOOD PRESSURE: 142 MMHG | TEMPERATURE: 98.1 F

## 2022-10-10 DIAGNOSIS — M06.9 RHEUMATOID ARTHRITIS, UNSPECIFIED: ICD-10-CM

## 2022-10-10 DIAGNOSIS — I25.10 ATHEROSCLEROTIC HEART DISEASE OF NATIVE CORONARY ARTERY W/OUT ANGINA PECTORIS: ICD-10-CM

## 2022-10-10 DIAGNOSIS — Z01.810 ENCOUNTER FOR PREPROCEDURAL CARDIOVASCULAR EXAMINATION: ICD-10-CM

## 2022-10-10 DIAGNOSIS — E78.5 HYPERLIPIDEMIA, UNSPECIFIED: ICD-10-CM

## 2022-10-10 DIAGNOSIS — I10 ESSENTIAL (PRIMARY) HYPERTENSION: ICD-10-CM

## 2022-10-10 DIAGNOSIS — Z98.61 ATHEROSCLEROTIC HEART DISEASE OF NATIVE CORONARY ARTERY W/OUT ANGINA PECTORIS: ICD-10-CM

## 2022-10-10 PROCEDURE — 99204 OFFICE O/P NEW MOD 45 MIN: CPT | Mod: 25

## 2022-10-10 PROCEDURE — 93000 ELECTROCARDIOGRAM COMPLETE: CPT | Mod: NC

## 2022-10-10 NOTE — HISTORY OF PRESENT ILLNESS
[FreeTextEntry1] : 82M h/o HTN, HLD, CAD/MI ( with 2 stents on Plavix, upper GI bleeding off ASA), rheumatoid arthritis, essential tremor (on metoprolol), trigeminal neuralgia with chronic L-sided facial pain planning for repeat trigeminal rhizotomy at [facility] scheduled for 10/14/22, presents for preoperative cardiac risk evaluation. \par \par Dr. Harvey Rivera [phone], fax [phone]\par \par His regular cardiologist (Dr. Orozco) is at [facility] last seen in July, has routine Echocardiogram there and no recent stress testing; patient reports since he is on vacation not able to obtain a clearance through their office. \par \par Baseline he is very active, he goes to the gym daily walking in the pool for 45 minutes without any chest discomfort. He has stopped the Plavix since last Friday, he had the same surgery in , had a bladder procedure 2 years ago, denies prior adverse effects with anesthesia or surgery. \par \par \par Nonsmoker, no alcohol\par Father/brother  cerebral hemorrhage

## 2022-10-10 NOTE — PHYSICAL EXAM
[Well Developed] : well developed [Well Nourished] : well nourished [No Acute Distress] : no acute distress [Normal Conjunctiva] : normal conjunctiva [Normal Venous Pressure] : normal venous pressure [No Carotid Bruit] : no carotid bruit [Normal S1, S2] : normal S1, S2 [No Murmur] : no murmur [No Rub] : no rub [No Gallop] : no gallop [Clear Lung Fields] : clear lung fields [Good Air Entry] : good air entry [No Respiratory Distress] : no respiratory distress  [Soft] : abdomen soft [Non Tender] : non-tender [No Masses/organomegaly] : no masses/organomegaly [Normal Bowel Sounds] : normal bowel sounds [Normal Gait] : normal gait [No Edema] : no edema [No Cyanosis] : no cyanosis [No Clubbing] : no clubbing [No Varicosities] : no varicosities [No Rash] : no rash [No Skin Lesions] : no skin lesions [Moves all extremities] : moves all extremities [No Focal Deficits] : no focal deficits [Normal Speech] : normal speech [Alert and Oriented] : alert and oriented [Normal memory] : normal memory [de-identified] : L-eye droop [de-identified] : arms tremors

## 2022-12-02 ENCOUNTER — APPOINTMENT (OUTPATIENT)
Dept: ORTHOPEDIC SURGERY | Facility: CLINIC | Age: 82
End: 2022-12-02
Payer: MEDICARE

## 2022-12-02 VITALS — WEIGHT: 155 LBS | HEIGHT: 71 IN | BODY MASS INDEX: 21.7 KG/M2

## 2022-12-02 PROCEDURE — J3490N: CUSTOM

## 2022-12-02 PROCEDURE — 99203 OFFICE O/P NEW LOW 30 MIN: CPT | Mod: 25

## 2022-12-02 PROCEDURE — 73030 X-RAY EXAM OF SHOULDER: CPT | Mod: LT

## 2022-12-02 PROCEDURE — 73010 X-RAY EXAM OF SHOULDER BLADE: CPT | Mod: LT

## 2022-12-02 PROCEDURE — 20611 DRAIN/INJ JOINT/BURSA W/US: CPT

## 2022-12-02 NOTE — IMAGING
[de-identified] : No swelling, no ecchymosis, no liz deformity\par +Tenderness to palpation over greater tuberosity\par No instability or tenderness over AC joint\par 100/60/10/10\par 5/5 supraspinatus, infraspinatus and subscapularis; there is pain with strength testing\par Positive Griffith test, positive impingement sign\par Speed’s and yergason negative\par Motor and sensory intact distally\par  [Left] : left shoulder [Glenohumeral arthritis] : Glenohumeral arthritis

## 2022-12-02 NOTE — HISTORY OF PRESENT ILLNESS
[7] : 7 [0] : 0 [Sharp] : sharp [Constant] : constant [Rest] : rest [Extending back] : extending back [] : Post Surgical Visit: no [FreeTextEntry1] : Lt shoulder  [FreeTextEntry5] : Patient is here for a new injury visit for the left shoulder. States shes been having ongoing pain since 2017. Does not recall any specific injury. was being treated by the rheumatologist who gave csi injection. last csi injection was over a year ago. states no relief from injection.

## 2022-12-02 NOTE — ASSESSMENT
[FreeTextEntry1] : ADVANCED GH OA\par 2 INJECTIONS WITH RHEUM LAST YEAR (UNSURE IF GH OR SUKHJINDER) WITH ONLY 2 WEEKS RELIEF\par REPEAT TODAY\par START VISCO IN 2 WEEKS IF NEEDED\par \par

## 2022-12-02 NOTE — PROCEDURE
[FreeTextEntry3] : -Large joint injection was performed of the LEFT shoulder glenohumeral joint\par -The indication for this procedure was pain, inflammation and x-ray evidence of Osteoarthritis on this or prior visit. The site was prepped with alcohol, betadine, ethyl chloride sprayed topically and sterile technique used. \par -An injection of Betamethasone 2cc, , Marcaine 5cc was used.  \par -Patient was advised to call if redness, pain or fever occur, apply ice for 15 minutes out of every hour for the next 12-24 hours as tolerated and patient was advised to rest the joint(s) for 2 days. Patient has tried OTC's including aspirin, Ibuprofen, Aleve, etc or prescription NSAIDS, and/or exercises at home and/or physical therapy without satisfactory response, patient had decreased mobility in the joint and the risks benefits, and alternatives have been discussed, and verbal consent was obtained. \par -Ultrasound guidance was indicated for this patient for glenohumeral injection. All ultrasound images have been permanently captured and stored accordingly in our picture archiving and communication system. Visualization of the needle and placement of injection was performed without complication.\par

## 2022-12-19 ENCOUNTER — APPOINTMENT (OUTPATIENT)
Dept: ORTHOPEDIC SURGERY | Facility: CLINIC | Age: 82
End: 2022-12-19

## 2022-12-19 PROCEDURE — 99213 OFFICE O/P EST LOW 20 MIN: CPT | Mod: 25

## 2022-12-19 PROCEDURE — 20611 DRAIN/INJ JOINT/BURSA W/US: CPT

## 2022-12-19 NOTE — HISTORY OF PRESENT ILLNESS
[7] : 7 [0] : 0 [Sharp] : sharp [Constant] : constant [Rest] : rest [Extending back] : extending back [de-identified] : 12/19/22 here for a follow up on the left shoulder ,pt had a cortisone 12/02/22 with no releif  [] : Post Surgical Visit: no [FreeTextEntry1] : Lt shoulder  [FreeTextEntry5] : Patient is here for a new injury visit for the left shoulder. States shes been having ongoing pain since 2017. Does not recall any specific injury. was being treated by the rheumatologist who gave csi injection. last csi injection was over a year ago. states no relief from injection.  [de-identified] : with activity  [de-identified] : cortisone injection

## 2022-12-19 NOTE — PROCEDURE
[FreeTextEntry3] : Large joint injection  was performed of the LEFT SHOULDER. \par The indication for this procedure was pain, inflammation and x-ray evidence of Osteoarthritis on this or prior visit. The site was prepped with alcohol, betadine, ethyl chloride sprayed topically and sterile technique used. \par An injection of EUFLEXXA 2ML  series; # 1  was used.  \par Patient was advised to call if redness, pain or fever occur, apply ice for 15 minutes out of every hour for the next 12-24 hours as tolerated and patient was advised to rest the joint(s) for 2 days. Patient has tried OTC's including aspirin, Ibuprofen, Aleve, etc or prescription NSAIDS, and/or exercises at home and/or physical therapy without satisfactory response, patient had decreased mobility in the joint and the risks benefits, and alternatives have been discussed, and verbal consent was obtained. \par Ultrasound guidance was indicated for this patient due to prior failure or difficult injection. All ultrasound images have been permanently captured and stored accordingly in our picture archiving and communication system. Visualization of the needle and placement of injection was performed without complication.\par

## 2022-12-19 NOTE — ASSESSMENT
[FreeTextEntry1] : MINIMAL RELIEF FROM CSI LAST VISIT\par STABLE EXAM\par R/B/A EUFLEXXA DISCUSSED\par EUFLEXXA #1 LEFT SHOULDER, TOLERATED WELL\par RTO 1 WEEK TO CONT SERIES

## 2022-12-19 NOTE — IMAGING
[Left] : left shoulder [Glenohumeral arthritis] : Glenohumeral arthritis [de-identified] : No swelling, no ecchymosis, no liz deformity\par +Tenderness to palpation over greater tuberosity\par No instability or tenderness over AC joint\par 100/60/10/10\par 5/5 supraspinatus, infraspinatus and subscapularis; there is pain with strength testing\par Positive Griffith test, positive impingement sign\par Speed’s and yergason negative\par Motor and sensory intact distally\par

## 2022-12-27 ENCOUNTER — APPOINTMENT (OUTPATIENT)
Dept: ORTHOPEDIC SURGERY | Facility: CLINIC | Age: 82
End: 2022-12-27

## 2022-12-27 VITALS — BODY MASS INDEX: 21.7 KG/M2 | WEIGHT: 155 LBS | HEIGHT: 71 IN

## 2022-12-27 PROCEDURE — 99213 OFFICE O/P EST LOW 20 MIN: CPT | Mod: 25

## 2022-12-27 PROCEDURE — 20610 DRAIN/INJ JOINT/BURSA W/O US: CPT | Mod: LT

## 2022-12-27 NOTE — HISTORY OF PRESENT ILLNESS
[2] : 2 [Euflexxa] : Euflexxa [7] : 7 [0] : 0 [Sharp] : sharp [Constant] : constant [Rest] : rest [Extending back] : extending back [de-identified] : 12/19/22 here for a follow up on the left shoulder ,pt had a cortisone 12/02/22 with no releif  [] : Post Surgical Visit: no [FreeTextEntry1] : Lt shoulder  [FreeTextEntry5] : Patient is here for a new injury visit for the left shoulder. States shes been having ongoing pain since 2017. Does not recall any specific injury. was being treated by the rheumatologist who gave csi injection. last csi injection was over a year ago. states no relief from injection.  [de-identified] : with activity  [de-identified] : cortisone injection [de-identified] : 12/19/22

## 2022-12-27 NOTE — PROCEDURE
[Left] : of the left [Shoulder] : shoulder [Euflexxa(20mg)] : 20mg of Euflexxa [#2] : series #2 [FreeTextEntry3] : Large joint injection  was performed of the LEFT SHOULDER. \par The indication for this procedure was pain, inflammation and x-ray evidence of Osteoarthritis on this or prior visit. The site was prepped with alcohol, betadine, ethyl chloride sprayed topically and sterile technique used. \par An injection of EUFLEXXA 2ML  series; # 1  was used.  \par Patient was advised to call if redness, pain or fever occur, apply ice for 15 minutes out of every hour for the next 12-24 hours as tolerated and patient was advised to rest the joint(s) for 2 days. Patient has tried OTC's including aspirin, Ibuprofen, Aleve, etc or prescription NSAIDS, and/or exercises at home and/or physical therapy without satisfactory response, patient had decreased mobility in the joint and the risks benefits, and alternatives have been discussed, and verbal consent was obtained. \par Ultrasound guidance was indicated for this patient due to prior failure or difficult injection. All ultrasound images have been permanently captured and stored accordingly in our picture archiving and communication system. Visualization of the needle and placement of injection was performed without complication.\par

## 2022-12-27 NOTE — PHYSICAL EXAM
[Left] : left shoulder [TWNoteComboBox7] : active forward flexion 90 degrees [de-identified] : active abduction 75 degrees [de-identified] : external rotation 0 degrees

## 2023-01-03 ENCOUNTER — RX RENEWAL (OUTPATIENT)
Age: 83
End: 2023-01-03

## 2023-01-03 ENCOUNTER — APPOINTMENT (OUTPATIENT)
Dept: ORTHOPEDIC SURGERY | Facility: CLINIC | Age: 83
End: 2023-01-03
Payer: MEDICARE

## 2023-01-03 PROCEDURE — 20610 DRAIN/INJ JOINT/BURSA W/O US: CPT

## 2023-01-03 PROCEDURE — 99212 OFFICE O/P EST SF 10 MIN: CPT | Mod: 25

## 2023-01-03 NOTE — PHYSICAL EXAM
[Left] : left shoulder [TWNoteComboBox7] : active forward flexion 90 degrees [de-identified] : active abduction 75 degrees [de-identified] : external rotation 0 degrees

## 2023-01-03 NOTE — PROCEDURE
[Left] : of the left [Shoulder] : shoulder [Euflexxa(20mg)] : 20mg of Euflexxa [#3] : series #3 [FreeTextEntry3] : Large joint injection  was performed of the LEFT SHOULDER. \par The indication for this procedure was pain, inflammation and x-ray evidence of Osteoarthritis on this or prior visit. The site was prepped with alcohol, betadine, ethyl chloride sprayed topically and sterile technique used. \par An injection of EUFLEXXA 2ML  series; # 1  was used.  \par Patient was advised to call if redness, pain or fever occur, apply ice for 15 minutes out of every hour for the next 12-24 hours as tolerated and patient was advised to rest the joint(s) for 2 days. Patient has tried OTC's including aspirin, Ibuprofen, Aleve, etc or prescription NSAIDS, and/or exercises at home and/or physical therapy without satisfactory response, patient had decreased mobility in the joint and the risks benefits, and alternatives have been discussed, and verbal consent was obtained. \par Ultrasound guidance was indicated for this patient due to prior failure or difficult injection. All ultrasound images have been permanently captured and stored accordingly in our picture archiving and communication system. Visualization of the needle and placement of injection was performed without complication.\par

## 2023-01-06 ENCOUNTER — APPOINTMENT (OUTPATIENT)
Dept: NEUROLOGY | Facility: CLINIC | Age: 83
End: 2023-01-06
Payer: MEDICARE

## 2023-01-06 VITALS
DIASTOLIC BLOOD PRESSURE: 70 MMHG | SYSTOLIC BLOOD PRESSURE: 118 MMHG | HEIGHT: 71 IN | BODY MASS INDEX: 21.7 KG/M2 | WEIGHT: 155 LBS

## 2023-01-06 PROCEDURE — 99214 OFFICE O/P EST MOD 30 MIN: CPT

## 2023-01-06 NOTE — ASSESSMENT
[FreeTextEntry1] : This is an 82 year old man with a history of essential tremor. \par He made no response to Mysoline. \par He is currently on a beta blocker. He will continue this.\par \par He also has trigeminal neuralgia for which he is status post surgery. \par He is having a severe exacerbation of left-sided facial pain.\par I will continue oxcarbazepine 600 mg twice per day.\par \par I will see him back in 6 months.

## 2023-01-06 NOTE — HISTORY OF PRESENT ILLNESS
[FreeTextEntry1] : I saw this patient in the office today.\par \par As you recall he has a long history of tremors.\par This has been going on for years.\par It is mild to moderate but does not bother him significantly.\par He had no response to primidone. He is already on a beta blocker.\par \par He also has a history of trigeminal neuralgia. He has had several exacerbations over the years. He is currently on Trileptal at a dosage of 300 mg twice per day.\par \par He underwent trigeminal neuralgia surgery in 2012.\par \par 1/6/2023 visit:\par Since increasing oxcarbazepine to 600 mg twice per day he has had no further facial pains.

## 2023-02-14 ENCOUNTER — APPOINTMENT (OUTPATIENT)
Dept: ORTHOPEDIC SURGERY | Facility: CLINIC | Age: 83
End: 2023-02-14
Payer: MEDICARE

## 2023-02-14 DIAGNOSIS — M19.012 PRIMARY OSTEOARTHRITIS, LEFT SHOULDER: ICD-10-CM

## 2023-02-14 PROCEDURE — 99213 OFFICE O/P EST LOW 20 MIN: CPT

## 2023-02-14 NOTE — DISCUSSION/SUMMARY
[de-identified] : viscoinjections can be repeated after 6 months(July 4). he is not interested in replacement surgery

## 2023-02-14 NOTE — HISTORY OF PRESENT ILLNESS
[7] : 7 [0] : 0 [Sharp] : sharp [Constant] : constant [Rest] : rest [Extending back] : extending back [2] : 2 [Euflexxa] : Euflexxa [de-identified] : 12/19/22 here for a follow up on the left shoulder ,pt had a cortisone 12/02/22 with no releif  [] : Post Surgical Visit: no [FreeTextEntry1] : Lt shoulder  [FreeTextEntry5] : Patient is here for a new injury visit for the left shoulder. States shes been having ongoing pain since 2017. Does not recall any specific injury. was being treated by the rheumatologist who gave csi injection. last csi injection was over a year ago. states no relief from injection.  [de-identified] : with activity  [de-identified] : cortisone injection [de-identified] : 12/19/22

## 2023-02-14 NOTE — PHYSICAL EXAM
[Left] : left shoulder [TWNoteComboBox7] : active forward flexion 90 degrees [de-identified] : active abduction 75 degrees [de-identified] : external rotation 0 degrees

## 2023-07-21 ENCOUNTER — APPOINTMENT (OUTPATIENT)
Dept: NEUROLOGY | Facility: CLINIC | Age: 83
End: 2023-07-21
Payer: MEDICARE

## 2023-07-21 VITALS
WEIGHT: 155 LBS | HEIGHT: 71 IN | BODY MASS INDEX: 21.7 KG/M2 | SYSTOLIC BLOOD PRESSURE: 128 MMHG | DIASTOLIC BLOOD PRESSURE: 70 MMHG

## 2023-07-21 PROCEDURE — 99214 OFFICE O/P EST MOD 30 MIN: CPT

## 2023-07-21 NOTE — PHYSICAL EXAM
[General Appearance - Alert] : alert [General Appearance - In No Acute Distress] : in no acute distress [Oriented To Time, Place, And Person] : oriented to person, place, and time [Memory Recent] : recent memory was not impaired [Affect] : the affect was normal [Memory Remote] : remote memory was not impaired [Cranial Nerves Optic (II)] : visual acuity intact bilaterally,  visual fields full to confrontation, pupils equal round and reactive to light [Cranial Nerves Oculomotor (III)] : extraocular motion intact [Cranial Nerves Trigeminal (V)] : facial sensation intact symmetrically [Cranial Nerves Facial (VII)] : face symmetrical [Cranial Nerves Vestibulocochlear (VIII)] : hearing was intact bilaterally [Cranial Nerves Glossopharyngeal (IX)] : tongue and palate midline [Cranial Nerves Accessory (XI - Cranial And Spinal)] : head turning and shoulder shrug symmetric [Cranial Nerves Hypoglossal (XII)] : there was no tongue deviation with protrusion [Motor Tone] : muscle tone was normal in all four extremities [Motor Strength] : muscle strength was normal in all four extremities [Sensation Tactile Decrease] : light touch was intact [Sensation Pain / Temperature Decrease] : pain and temperature was intact [Sensation Vibration Decrease] : vibration was intact [Abnormal Walk] : normal gait [Tremor] : a tremor present [2+] : Patella left 2+ [Optic Disc Abnormality] : the optic disc were normal in size and color [Edema] : there was no peripheral edema [Dysarthria] : no dysarthria [Aphasia] : no dysphasia/aphasia [Romberg's Sign] : Romberg's sign was negtive [Coordination - Dysmetria Impaired Finger-to-Nose Bilateral] : not present [Plantar Reflex Right Only] : normal on the right [Plantar Reflex Left Only] : normal on the left [FreeTextEntry8] : There is no cogwheeling.  There is no masking of facial expression.

## 2023-07-21 NOTE — ASSESSMENT
[FreeTextEntry1] : This is an 83 year old man with a history of essential tremor. \par He made no response to Mysoline. \par He is currently on a beta blocker. He will continue this.\par \par He also has trigeminal neuralgia for which he is status post surgery. \par He had an exacerbation which has now subsided.\par I will reduce oxcarbazepine to 300 mg twice per day.\par \par I will see him back in 6 months.

## 2023-07-21 NOTE — HISTORY OF PRESENT ILLNESS
[FreeTextEntry1] : I saw this patient in the office today.\par \par As you recall he has a long history of tremors.\par This has been going on for years.\par It is mild to moderate but does not bother him significantly.\par He had no response to primidone. He is already on a beta blocker.\par \par He also has a history of trigeminal neuralgia. He has had several exacerbations over the years. He is currently on Trileptal at a dosage of 300 mg twice per day.\par \par He underwent trigeminal neuralgia surgery in 2012.\par \par 7/21/2023 visit:\par Facial pains have subsided.\par He wishes to move over the Trileptal.

## 2023-08-02 ENCOUNTER — RX RENEWAL (OUTPATIENT)
Age: 83
End: 2023-08-02

## 2023-08-27 ENCOUNTER — NON-APPOINTMENT (OUTPATIENT)
Age: 83
End: 2023-08-27

## 2023-11-18 ENCOUNTER — NON-APPOINTMENT (OUTPATIENT)
Age: 83
End: 2023-11-18

## 2023-11-29 RX ORDER — OXCARBAZEPINE 300 MG/1
300 TABLET, FILM COATED ORAL TWICE DAILY
Qty: 180 | Refills: 1 | Status: ACTIVE | COMMUNITY
Start: 2017-12-13 | End: 1900-01-01

## 2024-01-25 ENCOUNTER — APPOINTMENT (OUTPATIENT)
Dept: NEUROLOGY | Facility: CLINIC | Age: 84
End: 2024-01-25

## 2024-04-18 ENCOUNTER — APPOINTMENT (OUTPATIENT)
Dept: NEUROLOGY | Facility: CLINIC | Age: 84
End: 2024-04-18
Payer: MEDICARE

## 2024-04-18 VITALS
SYSTOLIC BLOOD PRESSURE: 120 MMHG | WEIGHT: 152 LBS | DIASTOLIC BLOOD PRESSURE: 66 MMHG | HEIGHT: 71 IN | BODY MASS INDEX: 21.28 KG/M2

## 2024-04-18 DIAGNOSIS — G25.0 ESSENTIAL TREMOR: ICD-10-CM

## 2024-04-18 DIAGNOSIS — G50.0 TRIGEMINAL NEURALGIA: ICD-10-CM

## 2024-04-18 PROCEDURE — G2211 COMPLEX E/M VISIT ADD ON: CPT

## 2024-04-18 PROCEDURE — 99214 OFFICE O/P EST MOD 30 MIN: CPT

## 2024-04-18 RX ORDER — OXCARBAZEPINE 600 MG/1
600 TABLET, FILM COATED ORAL
Qty: 180 | Refills: 1 | Status: COMPLETED | COMMUNITY
Start: 2023-08-02 | End: 2024-04-18

## 2024-04-18 NOTE — HISTORY OF PRESENT ILLNESS
[FreeTextEntry1] : I saw this patient in the office today.  As you recall he has a long history of tremors. This has been going on for years. It is mild to moderate but does not bother him significantly. He had no response to primidone. He is already on a beta blocker.  He also has a history of trigeminal neuralgia. He has had several exacerbations over the years. He is currently on Trileptal at a dosage of 300 mg twice per day.  He underwent trigeminal neuralgia surgery in 2012.  7/21/2023 visit: Facial pains have subsided.  4/18/2024 visit: He reports that the facial pain has not been bothering him much lately. He continues to have mild tremors.

## 2024-04-18 NOTE — PHYSICAL EXAM
[General Appearance - Alert] : alert [General Appearance - In No Acute Distress] : in no acute distress [Oriented To Time, Place, And Person] : oriented to person, place, and time [Affect] : the affect was normal [Memory Recent] : recent memory was not impaired [Memory Remote] : remote memory was not impaired [Dysarthria] : no dysarthria [Aphasia] : no dysphasia/aphasia [Cranial Nerves Optic (II)] : visual acuity intact bilaterally,  visual fields full to confrontation, pupils equal round and reactive to light [Cranial Nerves Oculomotor (III)] : extraocular motion intact [Cranial Nerves Trigeminal (V)] : facial sensation intact symmetrically [Cranial Nerves Facial (VII)] : face symmetrical [Cranial Nerves Vestibulocochlear (VIII)] : hearing was intact bilaterally [Cranial Nerves Glossopharyngeal (IX)] : tongue and palate midline [Cranial Nerves Accessory (XI - Cranial And Spinal)] : head turning and shoulder shrug symmetric [Cranial Nerves Hypoglossal (XII)] : there was no tongue deviation with protrusion [Motor Tone] : muscle tone was normal in all four extremities [Motor Strength] : muscle strength was normal in all four extremities [Sensation Tactile Decrease] : light touch was intact [Sensation Pain / Temperature Decrease] : pain and temperature was intact [Sensation Vibration Decrease] : vibration was intact [Romberg's Sign] : Romberg's sign was negtive [Abnormal Walk] : normal gait [Tremor] : a tremor present [Coordination - Dysmetria Impaired Finger-to-Nose Bilateral] : not present [2+] : Patella left 2+ [Plantar Reflex Right Only] : normal on the right [Plantar Reflex Left Only] : normal on the left [FreeTextEntry8] : There is no cogwheeling.  There is no masking of facial expression. [Optic Disc Abnormality] : the optic disc were normal in size and color [Edema] : there was no peripheral edema

## 2024-04-18 NOTE — ASSESSMENT
[FreeTextEntry1] : This is an 83-year-old man with a history of essential tremor.  He made no response to Mysoline.  He is currently on a beta blocker. He will continue this.  He also has trigeminal neuralgia for which he is status post surgery.  He had an exacerbation which has now subsided. He is now on oxcarbazepine 300 mg twice per day.  I will see him back in 6 months.

## 2024-05-15 ENCOUNTER — OFFICE (OUTPATIENT)
Dept: URBAN - METROPOLITAN AREA CLINIC 104 | Facility: CLINIC | Age: 84
Setting detail: OPHTHALMOLOGY
End: 2024-05-15
Payer: MEDICARE

## 2024-05-15 DIAGNOSIS — M06.9: ICD-10-CM

## 2024-05-15 DIAGNOSIS — H25.13: ICD-10-CM

## 2024-05-15 DIAGNOSIS — H25.11: ICD-10-CM

## 2024-05-15 DIAGNOSIS — H35.3111: ICD-10-CM

## 2024-05-15 PROBLEM — H25.12 CATARACT SENILE NUCLEAR SCLEROSIS; RIGHT EYE, LEFT EYE, BOTH EYES ;
WITH CORTICAL OU: Status: ACTIVE | Noted: 2024-05-15

## 2024-05-15 PROCEDURE — 92004 COMPRE OPH EXAM NEW PT 1/>: CPT | Performed by: SPECIALIST

## 2024-05-15 PROCEDURE — 92136 OPHTHALMIC BIOMETRY: CPT | Mod: TC | Performed by: SPECIALIST

## 2024-05-15 PROCEDURE — 92134 CPTRZ OPH DX IMG PST SGM RTA: CPT | Performed by: SPECIALIST

## 2024-05-15 PROCEDURE — 92136 OPHTHALMIC BIOMETRY: CPT | Mod: 26,RT | Performed by: SPECIALIST

## 2024-05-15 ASSESSMENT — CONFRONTATIONAL VISUAL FIELD TEST (CVF)
OD_FINDINGS: FULL
OS_FINDINGS: FULL

## 2024-06-20 ENCOUNTER — RX ONLY (RX ONLY)
Age: 84
End: 2024-06-20

## 2024-06-21 ENCOUNTER — ASC (OUTPATIENT)
Dept: URBAN - METROPOLITAN AREA SURGERY 8 | Facility: SURGERY | Age: 84
Setting detail: OPHTHALMOLOGY
End: 2024-06-21
Payer: MEDICARE

## 2024-06-21 DIAGNOSIS — H52.211: ICD-10-CM

## 2024-06-21 DIAGNOSIS — H25.11: ICD-10-CM

## 2024-06-21 PROCEDURE — 66984 XCAPSL CTRC RMVL W/O ECP: CPT | Mod: RT | Performed by: SPECIALIST

## 2024-06-21 PROCEDURE — 68841 INSJ RX ELUT IMPLT LAC CANAL: CPT | Mod: RT | Performed by: SPECIALIST

## 2024-06-21 PROCEDURE — A9270 NON-COVERED ITEM OR SERVICE: HCPCS | Mod: GY | Performed by: SPECIALIST

## 2024-06-21 PROCEDURE — FEMTO FEMTOSECOND LASER: Mod: GY | Performed by: SPECIALIST

## 2024-06-22 ENCOUNTER — OFFICE (OUTPATIENT)
Dept: URBAN - METROPOLITAN AREA CLINIC 104 | Facility: CLINIC | Age: 84
Setting detail: OPHTHALMOLOGY
End: 2024-06-22
Payer: MEDICARE

## 2024-06-22 DIAGNOSIS — Z96.1: ICD-10-CM

## 2024-06-22 PROCEDURE — 99024 POSTOP FOLLOW-UP VISIT: CPT | Performed by: OPTOMETRIST

## 2024-06-22 ASSESSMENT — CONFRONTATIONAL VISUAL FIELD TEST (CVF)
OD_FINDINGS: FULL
OS_FINDINGS: FULL

## 2024-06-27 ENCOUNTER — OFFICE (OUTPATIENT)
Dept: URBAN - METROPOLITAN AREA CLINIC 104 | Facility: CLINIC | Age: 84
Setting detail: OPHTHALMOLOGY
End: 2024-06-27
Payer: MEDICARE

## 2024-06-27 DIAGNOSIS — H25.12: ICD-10-CM

## 2024-06-27 DIAGNOSIS — Z96.1: ICD-10-CM

## 2024-06-27 PROCEDURE — 99024 POSTOP FOLLOW-UP VISIT: CPT | Performed by: OPTOMETRIST

## 2024-06-27 PROCEDURE — 92136 OPHTHALMIC BIOMETRY: CPT | Mod: 26,LT | Performed by: SPECIALIST

## 2024-06-27 ASSESSMENT — CONFRONTATIONAL VISUAL FIELD TEST (CVF)
OS_FINDINGS: FULL
OD_FINDINGS: FULL

## 2024-06-28 ENCOUNTER — ASC (OUTPATIENT)
Dept: URBAN - METROPOLITAN AREA SURGERY 8 | Facility: SURGERY | Age: 84
Setting detail: OPHTHALMOLOGY
End: 2024-06-28
Payer: MEDICARE

## 2024-06-28 ENCOUNTER — RX ONLY (RX ONLY)
Age: 84
End: 2024-06-28

## 2024-06-28 DIAGNOSIS — H52.212: ICD-10-CM

## 2024-06-28 DIAGNOSIS — H25.12: ICD-10-CM

## 2024-06-28 PROCEDURE — 66984 XCAPSL CTRC RMVL W/O ECP: CPT | Mod: 79,LT | Performed by: SPECIALIST

## 2024-06-28 PROCEDURE — FEMTO PRECISION LASER CATARACT SURGERY: Mod: GY | Performed by: SPECIALIST

## 2024-06-28 PROCEDURE — A9270 NON-COVERED ITEM OR SERVICE: HCPCS | Mod: GY | Performed by: SPECIALIST

## 2024-06-28 PROCEDURE — 68841 INSJ RX ELUT IMPLT LAC CANAL: CPT | Mod: 79,LT | Performed by: SPECIALIST

## 2024-06-29 ENCOUNTER — RX ONLY (RX ONLY)
Age: 84
End: 2024-06-29

## 2024-06-29 PROBLEM — Z96.1: Status: ACTIVE | Noted: 2024-06-22

## 2024-07-03 ENCOUNTER — OFFICE (OUTPATIENT)
Dept: URBAN - METROPOLITAN AREA CLINIC 104 | Facility: CLINIC | Age: 84
Setting detail: OPHTHALMOLOGY
End: 2024-07-03
Payer: MEDICARE

## 2024-07-03 DIAGNOSIS — Z96.1: ICD-10-CM

## 2024-07-03 PROCEDURE — 99024 POSTOP FOLLOW-UP VISIT: CPT | Performed by: OPTOMETRIST

## 2024-07-03 ASSESSMENT — CONFRONTATIONAL VISUAL FIELD TEST (CVF)
OS_FINDINGS: FULL
OD_FINDINGS: FULL

## 2024-07-24 ENCOUNTER — RX ONLY (RX ONLY)
Age: 84
End: 2024-07-24

## 2024-07-24 ENCOUNTER — OFFICE (OUTPATIENT)
Dept: URBAN - METROPOLITAN AREA CLINIC 104 | Facility: CLINIC | Age: 84
Setting detail: OPHTHALMOLOGY
End: 2024-07-24
Payer: MEDICARE

## 2024-07-24 DIAGNOSIS — H16.223: ICD-10-CM

## 2024-07-24 PROCEDURE — 99024 POSTOP FOLLOW-UP VISIT: CPT | Performed by: OPTOMETRIST

## 2024-07-24 ASSESSMENT — CONFRONTATIONAL VISUAL FIELD TEST (CVF)
OS_FINDINGS: FULL
OD_FINDINGS: FULL

## 2024-08-23 ENCOUNTER — RX RENEWAL (OUTPATIENT)
Age: 84
End: 2024-08-23

## 2024-10-25 ENCOUNTER — APPOINTMENT (OUTPATIENT)
Dept: NEUROLOGY | Facility: CLINIC | Age: 84
End: 2024-10-25
Payer: MEDICARE

## 2024-10-25 VITALS
BODY MASS INDEX: 21.28 KG/M2 | HEIGHT: 71 IN | WEIGHT: 152 LBS | HEART RATE: 78 BPM | SYSTOLIC BLOOD PRESSURE: 117 MMHG | DIASTOLIC BLOOD PRESSURE: 66 MMHG

## 2024-10-25 DIAGNOSIS — G50.0 TRIGEMINAL NEURALGIA: ICD-10-CM

## 2024-10-25 DIAGNOSIS — G25.0 ESSENTIAL TREMOR: ICD-10-CM

## 2024-10-25 PROCEDURE — 99214 OFFICE O/P EST MOD 30 MIN: CPT

## 2024-10-25 PROCEDURE — G2211 COMPLEX E/M VISIT ADD ON: CPT

## 2024-12-03 ENCOUNTER — OFFICE (OUTPATIENT)
Dept: URBAN - METROPOLITAN AREA CLINIC 104 | Facility: CLINIC | Age: 84
Setting detail: OPHTHALMOLOGY
End: 2024-12-03
Payer: MEDICARE

## 2024-12-03 DIAGNOSIS — H16.223: ICD-10-CM

## 2024-12-03 DIAGNOSIS — H35.3111: ICD-10-CM

## 2024-12-03 PROCEDURE — 92014 COMPRE OPH EXAM EST PT 1/>: CPT | Performed by: OPTOMETRIST

## 2024-12-03 PROCEDURE — 92134 CPTRZ OPH DX IMG PST SGM RTA: CPT | Performed by: OPTOMETRIST

## 2024-12-03 ASSESSMENT — SUPERFICIAL PUNCTATE KERATITIS (SPK)
OD_SPK: 1+ 2+
OS_SPK: 2+ 3+

## 2024-12-03 ASSESSMENT — TONOMETRY
OS_IOP_MMHG: 11
OD_IOP_MMHG: 11

## 2024-12-03 ASSESSMENT — VISUAL ACUITY
OS_BCVA: 20/30+2
OD_BCVA: 20/20-1

## 2024-12-03 ASSESSMENT — KERATOMETRY
OS_K1POWER_DIOPTERS: 43.60
OS_K2POWER_DIOPTERS: 44.29
OS_AXISANGLE_DEGREES: 54
OD_K1POWER_DIOPTERS: 43.83
OD_AXISANGLE_DEGREES: 176
OD_K2POWER_DIOPTERS: 44.82

## 2024-12-03 ASSESSMENT — REFRACTION_AUTOREFRACTION
OS_SPHERE: +2.00
OD_CYLINDER: -0.75
OS_AXIS: 122
OS_CYLINDER: -2.25
OD_SPHERE: +0.25
OD_AXIS: 69

## 2024-12-03 ASSESSMENT — CONFRONTATIONAL VISUAL FIELD TEST (CVF)
OS_FINDINGS: FULL
OD_FINDINGS: FULL

## 2025-05-07 ENCOUNTER — APPOINTMENT (OUTPATIENT)
Dept: NEUROLOGY | Facility: CLINIC | Age: 85
End: 2025-05-07
Payer: MEDICARE

## 2025-05-07 VITALS
SYSTOLIC BLOOD PRESSURE: 158 MMHG | HEART RATE: 61 BPM | WEIGHT: 155 LBS | HEIGHT: 71 IN | DIASTOLIC BLOOD PRESSURE: 74 MMHG | BODY MASS INDEX: 21.7 KG/M2

## 2025-05-07 DIAGNOSIS — G25.0 ESSENTIAL TREMOR: ICD-10-CM

## 2025-05-07 DIAGNOSIS — G50.0 TRIGEMINAL NEURALGIA: ICD-10-CM

## 2025-05-07 PROCEDURE — G2211 COMPLEX E/M VISIT ADD ON: CPT

## 2025-05-07 PROCEDURE — 99214 OFFICE O/P EST MOD 30 MIN: CPT
